# Patient Record
Sex: MALE | Race: WHITE
[De-identification: names, ages, dates, MRNs, and addresses within clinical notes are randomized per-mention and may not be internally consistent; named-entity substitution may affect disease eponyms.]

---

## 2020-10-03 NOTE — EDPHYS
Physician Documentation                                                                           

 [facility]                                                                 

Name: Naren Douglas                                                                                  

Age: 60 yrs                                                                                       

Sex: Male                                                                                         

: 1960                                                                                   

MRN: [id number]                                                                                   

Arrival Date: 10/03/2020                                                                          

Time: 09:30                                                                                       

Account#: [id number]                                                                            

Bed 8                                                                                             

Private MD:                                                                                       

ED Physician Jensen Arias                                                                       

HPI:                                                                                              

10/03                                                                                             

11:29 This 60 yrs old  Male presents to ER via Ambulatory with complaints of         snw 

      polycythemia.                                                                               

11:29 Onset: The symptoms/episode began/occurred pt has been getting therapeutic phlebotomy x snw 

      5 years, pt states he is two weeks late this time. He normally gets between 400-700mls      

      removed. Associated signs and symptoms: Pertinent positives: congestion, shortness of       

      breath, palpitations - a. fib. Modifying factors: The patient symptoms are alleviated       

      by phlebotomy. The patient has experienced similar episodes in the past. The patient        

      has not recently seen a physician.                                                          

                                                                                                  

Historical:                                                                                       

- PMHx:                                                                                           

09:58 Polycythemia;                                                                           jl7 

- PSHx:                                                                                           

09:58 stent ; Appendectomy; knee sx; Hernia repair;                                       jl7 

                                                                                                  

- Immunization history:: Adult Immunizations unknown.                                             

- Social history:: Smoking status: Patient denies any tobacco usage or history of.                

                                                                                                  

                                                                                                  

ROS:                                                                                              

11:27 Eyes: Negative for injury, pain, redness, and discharge, ENT: Negative for injury,      snw 

      pain, and discharge, Neck: Negative for injury, pain, and swelling.                         

11:27 Abdomen/GI: Negative for abdominal pain, nausea, vomiting, diarrhea, and constipation,      

      Back: Negative for injury and pain, : Negative for injury, bleeding, discharge, and       

      swelling, MS/Extremity: Negative for injury and deformity, Skin: Negative for injury,       

      rash, and discoloration, Neuro: Negative for headache, weakness, numbness, tingling,        

      and seizure, Psych: Negative for depression, anxiety, suicide ideation, homicidal           

      ideation, and hallucinations.                                                               

11:27 Constitutional: Positive for body aches, malaise, SOB.                                      

11:27 Cardiovascular: Positive for palpitations.                                                  

11:27 Respiratory: Positive for dyspnea on exertion, shortness of breath.                         

                                                                                                  

Exam:                                                                                             

09:45 Head/Face:  Normocephalic, atraumatic. Eyes:  Pupils equal round and reactive to light, snw 

      extra-ocular motions intact.  Lids and lashes normal.  Conjunctiva and sclera are           

      non-icteric and not injected.  Cornea within normal limits.  Periorbital areas with no      

      swelling, redness, or edema. ENT:  Nares patent. No nasal discharge, no septal              

      abnormalities noted.  Tympanic membranes are normal and external auditory canals are        

      clear.  Oropharynx with no redness, swelling, or masses, exudates, or evidence of           

      obstruction, uvula midline.  Mucous membranes moist. Neck:  Trachea midline, no             

      thyromegaly or masses palpated, and no cervical lymphadenopathy.  Supple, full range of     

      motion without nuchal rigidity, or vertebral point tenderness.  No Meningismus.             

      Chest/axilla:  Normal chest wall appearance and motion.  Nontender with no deformity.       

      No lesions are appreciated.                                                                 

09:45 Abdomen/GI:  Soft, non-tender, with normal bowel sounds.  No distension or tympany.  No     

      guarding or rebound.  No evidence of tenderness throughout. Back:  No spinal                

      tenderness.  No costovertebral tenderness.  Full range of motion. MS/ Extremity:            

      Pulses equal, no cyanosis.  Neurovascular intact.  Full, normal range of motion. Neuro:     

       Awake and alert, GCS 15, oriented to person, place, time, and situation.  Cranial          

      nerves II-XII grossly intact.  Motor strength 5/5 in all extremities.  Sensory grossly      

      intact.  Cerebellar exam normal.  Normal gait. Psych:  Awake, alert, with orientation       

      to person, place and time.  Behavior, mood, and affect are within normal limits.            

09:45 Constitutional: The patient appears alert, awake, obese, restless, uncomfortable.           

09:45 Cardiovascular: Rate: normal, Rhythm: irregularly irregular, Pulses: no pulse deficits      

      are appreciated, Heart sounds: normal, Edema: is not appreciated.                           

09:45 Respiratory: mild respiratory distress is noted,  Respirations: shallow respirations,       

      that is moderate, Breath sounds: decreased breath sounds, that are moderate.                

09:45 Skin: Appearance: Color: bronze with white nailbeds.                                        

                                                                                                  

Vital Signs:                                                                                      

09:31  / 113; Pulse 65; Resp 22; Temp 98.2; Pulse Ox 100% ;                             jl7 

10:18  / 114; Pulse 93; Resp 19; Pulse Ox 97% ;                                         jl7 

10:49  / 102; Pulse 86; Resp 17; Pulse Ox 99% ;                                         jl7 

11:30  / 102; Pulse 72; Resp 15; Pulse Ox 100% ;                                        jl7 

12:00  / 103; Pulse 63; Resp 15; Pulse Ox 100% ;                                        jl7 

13:00  / 103; Pulse 61; Resp 17; Pulse Ox 99% ;                                         7 

                                                                                                  

MDM:                                                                                              

09:52 Patient medically screened.                                                             snw 

13:11 Data reviewed: vital signs, nurses notes, lab test result(s), EKG, radiologic studies.  snw 

      Data interpreted: Pulse oximetry: on room air is 99 %. Interpretation: normal.              

      Counseling: I had a detailed discussion with the patient and/or guardian regarding: the     

      historical points, exam findings, and any diagnostic results supporting the                 

      discharge/admit diagnosis, lab results, radiology results, the need for outpatient          

      follow up, to return to the emergency department if symptoms worsen or persist or if        

      there are any questions or concerns that arise at home. Response to treatment: the          

      patient's symptoms have markedly improved after treatment. Special discussion: Based on     

      the patient's history, exam, and Dx evaluation, there is no indication for emergent         

      intervention or inpatient Tx. It is understood by the patient/guardian that if the Sx's     

      persist or worsen they need to return immediately for re-evaluation. Based on the           

      history and exam findings, there is no indication for further emergent testing or           

      inpatient evaluation. I discussed with the patient/guardian the need to see the             

      cardiologist for further evaluation of the symptoms. I discussed with the                   

      patient/guardian the need to see the hematologist/oncologist for further evaluation of      

      the symptoms. I discussed with the patient/guardian the need to see the primary care        

      provider for further evaluation of the symptoms.                                            

13:18 ED course: Pt states he feels much better, will f/u as soon as possible.                snw 

                                                                                                  

10/03                                                                                             

09:42 Order name: Basic Metabolic Panel                                                       snw 

10/03                                                                                             

09:42 Order name: CBC with Diff; Complete Time: 10:21                                         snw 

10/03                                                                                             

09:42 Order name: LFT's; Complete Time: 10:40                                                 snw 

10/03                                                                                             

09:42 Order name: Magnesium; Complete Time: 10:40                                             snw 

10/03                                                                                             

09:42 Order name: NT PRO-BNP; Complete Time: 10:40                                            snw 

10/03                                                                                             

09:42 Order name: PT-INR; Complete Time: 10:21                                                snw 

10/03                                                                                             

09:42 Order name: Troponin (emerg Dept Use Only); Complete Time: 10:40                        snw 

10/03                                                                                             

09:42 Order name: Urine Culture                                                               snw 

10/03                                                                                             

09:42 Order name: Urine Microscopic Only; Complete Time: 10:40                                snw 

10/03                                                                                             

09:42 Order name: Basic Metabolic Panel; Complete Time: 10:40                                 EDMS

10/03                                                                                             

09:44 Order name: Urine Dipstick--Ancillary (enter results); Complete Time: 10:40             eb  

10/03                                                                                             

09:50 Order name: Pth,Intact                                                                  snw 

10/03                                                                                             

09:50 Order name: Cortisol                                                                    snw 

10/03                                                                                             

10:58 Order name: CBC with Diff; Complete Time: 11:26                                         jl7 

10/03                                                                                             

09:42 Order name: XRAY Chest (1 view); Complete Time: 11:26                                   snw 

10/03                                                                                             

09:42 Order name: EKG; Complete Time: 09:43                                                   snw 

10/03                                                                                             

09:42 Order name: Cardiac monitoring; Complete Time: 09:59                                    snw 

10/03                                                                                             

09:42 Order name: EKG - Nurse/Tech; Complete Time: 09:44                                      snw 

10/03                                                                                             

09:42 Order name: IV Saline Lock; Complete Time: 09:59                                        snw 

10/03                                                                                             

09:42 Order name: Labs collected and sent; Complete Time: 09:59                               snw 

10/03                                                                                             

09:42 Order name: O2 Per Protocol; Complete Time: 09:59                                       snw 

10/03                                                                                             

09:42 Order name: O2 Sat Monitoring; Complete Time: 09:59                                     snw 

10/03                                                                                             

09:42 Order name: Urine Dipstick-Ancillary (obtain specimen); Complete Time: 09:59            snw 

10/03                                                                                             

09:43 Order name: Misc. Order: therapeutic phlebotomy; Complete Time: 10:49                   snw 

                                                                                                  

EC:45 Rate is 71 beats/min. Rhythm is irregularly irregular. QRS Axis is Normal. QRS interval snw 

      is normal. QT interval is normal. Q waves are Present in leads II, III, aVF. Clinical       

      impression: Atrial Fibrillation.                                                            

                                                                                                  

Administered Medications:                                                                         

10:20 Drug: Lovenox 100 mg Route: Sub-Q; Site: abdomen;                                       jl7 

13:20 Follow up: Response: No adverse reaction                                                jl7 

10:58 Drug: NS 0.9% 500 ml Route: IV; Rate: bolus; Site: left forearm;                        jl7 

11:30 Follow up: Response: No adverse reaction; IV Status: Completed infusion; IV Intake:     jl7 

      500ml                                                                                       

                                                                                                  

                                                                                                  

Disposition:                                                                                      

16:04 Co-signature as Attending Physician, Jensen Arias MD I agree with the assessment and   kdr 

      plan of care.                                                                               

                                                                                                  

Disposition:                                                                                      

10/03/20 13:13 Discharged to Home. Impression: Polycythemia vera, Atrial fibrillation and         

  flutter.                                                                                        

- Condition is Stable.                                                                            

- Discharge Instructions: Atrial Fibrillation, Polycythemia Vera, Therapeutic                     

  Phlebotomy, Care After.                                                                         

- Prescriptions for Eliquis 5 mg Oral tablet - take 1 tablet by ORAL route 2 times per            

  day; 60 tablet. flecainide 100 mg Oral tablet - take 1 tablet by ORAL route every 12            

  hours; 60 tablet.                                                                               

- Medication Reconciliation Form, Thank You Letter, Antibiotic Education, Prescription            

  Opioid Use form.                                                                                

- Follow up: Emergency Department; When: As needed; Reason: Worsening of condition.               

  Follow up: Private Physician; When: 2 - 3 days; Reason: Recheck today's complaints,             

  Continuance of care, Re-evaluation by your physician.                                           

                                                                                                  

                                                                                                  

                                                                                                  

Signatures:                                                                                       

Dispatcher [employer]                           EDMS                                                 

Jensen Arias MD MD kdr Waters, Shelly, JOAQUÍNP-C                   FNP-Bar Tran RN                        RN   jl7                                                  

                                                                                                  

Corrections: (The following items were deleted from the chart)                                    

13:34 13:13 10/03/2020 13:13 Discharged to Home. Impression: Polycythemia vera; Atrial        jl7 

      fibrillation and flutter. Condition is Stable. Forms are Medication Reconciliation          

      Form, Thank You Letter, Antibiotic Education, Prescription Opioid Use. Follow up:           

      Emergency Department; When: As needed; Reason: Worsening of condition. Follow up:           

      Private Physician; When: 2 - 3 days; Reason: Recheck today's complaints, Continuance of     

      care, Re-evaluation by your physician. snw                                                  

                                                                                                  

**************************************************************************************************

## 2020-10-03 NOTE — RAD REPORT
EXAM DESCRIPTION:  [facility] Single View10/3/2020 10:25 am

 

CLINICAL HISTORY:  Congestion

 

COMPARISON:  2015

 

FINDINGS:   The lungs appear clear of acute infiltrate. The heart is mildly to moderately enlarged

 

IMPRESSION:   No acute abnormalities displayed

## 2020-10-03 NOTE — ER
Nurse's Notes                                                                                     

 [facility]                                                                 

Name: Naren Douglas                                                                                  

Age: 60 yrs                                                                                       

Sex: Male                                                                                         

: 1960                                                                                   

MRN: [id number]                                                                                   

Arrival Date: 10/03/2020                                                                          

Time: 09:30                                                                                       

Account#: [id number]                                                                            

Bed 8                                                                                             

Private MD:                                                                                       

Diagnosis: Polycythemia vera;Atrial fibrillation and flutter                                      

                                                                                                  

Presentation:                                                                                     

10/03                                                                                             

09:31 Chief complaint: Patient states: SOB and palpitations x 3 days. Coronavirus screen:     [facility] 

      Client denies travel out of the [place] in the last 14 days. shortness of breath, Client       

      presents with at least one sign or symptom that may indicate coronavirus-19.                

      Standard/surgical mask placed on the client. Provider contacted for isolation               

      considerations. Ebola Screen: No symptoms or risks identified at this time. Initial         

      Sepsis Screen: Does the patient meet any 2 criteria? No. Patient's initial sepsis           

      screen is negative. Does the patient have a suspected source of infection? No.              

      Patient's initial sepsis screen is negative. Risk Assessment: Do you want to hurt           

      yourself or someone else? Patient reports no desire to harm self or others.                 

09:31 Method Of Arrival: Ambulatory                                                           [facility] 

09:31 Acuity: ELEANOR 3                                                                           [facility] 

09:31 Onset of symptoms was 2020. Care prior to arrival: None. Transition of care: [facility] 

      patient was not received from another setting of care.                                      

                                                                                                  

Triage Assessment:                                                                                

09:58 General: Appears in no apparent distress. uncomfortable, Behavior is calm, cooperative, jl7 

      appropriate for age. Pain: Complains of pain in right mid back. Neuro: Level of             

      Consciousness is awake, alert, obeys commands, Oriented to person, place, time,             

      situation. Cardiovascular: Patient's skin is warm and dry. Respiratory: Airway is           

      patent Respiratory effort is even, unlabored, Respiratory pattern is regular,               

      symmetrical. Derm: Skin is pink, warm \T\ dry.                                              

                                                                                                  

Historical:                                                                                       

- PMHx:                                                                                           

09:58 Polycythemia;                                                                           jl7 

- PSHx:                                                                                           

09:58 stent ; Appendectomy; knee sx; Hernia repair;                                       jl7 

                                                                                                  

- Immunization history:: Adult Immunizations unknown.                                             

- Social history:: Smoking status: Patient denies any tobacco usage or history of.                

                                                                                                  

                                                                                                  

Screening:                                                                                        

10:17 Abuse screen: Denies threats or abuse. Denies injuries from another. Nutritional        jl7 

      screening: No deficits noted. Tuberculosis screening: No symptoms or risk factors           

      identified. Fall Risk IV access (20 points). Total Wilkerson Fall Scale indicates No Risk       

      (0-24 pts).                                                                                 

                                                                                                  

Assessment:                                                                                       

10:00 General: See triage assessment.                                                         jl7 

11:00 Reassessment: Patient appears in no apparent distress at this time. No changes from     jl7 

      previously documented assessment. Patient and/or family updated on plan of care and         

      expected duration. Pain level reassessed. Patient is alert, oriented x 3, equal             

      unlabored respirations, skin warm/dry/pink.                                                 

12:00 Reassessment: Patient appears in no apparent distress at this time. Patient and/or      jl7 

      family updated on plan of care and expected duration. Pain level reassessed. Patient is     

      alert, oriented x 3, equal unlabored respirations, skin warm/dry/pink. Patient states       

      feeling better. Patient states symptoms have improved.                                      

13:00 Reassessment: Patient appears in no apparent distress at this time. No changes from     jl7 

      previously documented assessment. Patient and/or family updated on plan of care and         

      expected duration. Pain level reassessed. Patient is alert, oriented x 3, equal             

      unlabored respirations, skin warm/dry/pink.                                                 

                                                                                                  

Vital Signs:                                                                                      

09:31  / 113; Pulse 65; Resp 22; Temp 98.2; Pulse Ox 100% ;                             jl7 

10:18  / 114; Pulse 93; Resp 19; Pulse Ox 97% ;                                         jl7 

10:49  / 102; Pulse 86; Resp 17; Pulse Ox 99% ;                                         jl7 

11:30  / 102; Pulse 72; Resp 15; Pulse Ox 100% ;                                        jl7 

12:00  / 103; Pulse 63; Resp 15; Pulse Ox 100% ;                                        jl7 

13:00  / 103; Pulse 61; Resp 17; Pulse Ox 99% ;                                         jl7 

                                                                                                  

ED Course:                                                                                        

09:30 Patient arrived in ED.                                                                  as  

09:31 Bar Gonzalez, RN is Primary Nurse.                                                      jl7 

09:40 [facility] Jeff[facility]C [facility]                                                          snw 

09:40 Jensen Arias MD is Attending Physician.                                              snw 

09:44 EKG done, by ED staff, reviewed by Cathleen PAZ.                                 em1 

09:50 Initial lab(s) drawn, by me, sent to lab. Urine collected: clean catch specimen, clear. jl7 

      Inserted saline lock: 20 gauge in left forearm, using aseptic technique. Blood              

      collected.                                                                                  

09:57 Triage completed.                                                                       jl7 

09:58 Arm band placed on right wrist.                                                         jl7 

10:17 Patient has correct armband on for positive identification. Placed in gown. Bed in low  jl7 

      position. Call light in reach. Side rails up X 1. Cardiac monitor on. Pulse ox on. NIBP     

      on.                                                                                         

10:25 XRAY Chest (1 view) In Process Unspecified.                                             EDMS

10:58 Basic Metabolic Panel Sent.                                                             jl7 

10:58 Repeat lab(s) drawn. by me, sent to lab.                                                jl7 

13:34 No provider procedures requiring assistance completed. IV discontinued, intact,         jl7 

      bleeding controlled, No redness/swelling at site. Pressure dressing applied.                

                                                                                                  

Administered Medications:                                                                         

10:20 Drug: Lovenox 100 mg Route: Sub-Q; Site: abdomen;                                       jl7 

13:20 Follow up: Response: No adverse reaction                                                jl7 

10:58 Drug: NS 0.9% 500 ml Route: IV; Rate: bolus; Site: left forearm;                        jl7 

11:30 Follow up: Response: No adverse reaction; IV Status: Completed infusion; IV Intake:     jl7 

      500ml                                                                                       

                                                                                                  

                                                                                                  

Intake:                                                                                           

11:30 IV: 500ml; Total: 500ml.                                                                jl7 

                                                                                                  

Outcome:                                                                                          

13:13 Discharge ordered by MD.                                                                edson 

13:34 Discharged to home ambulatory.                                                          jl7 

13:34 Condition: stable                                                                           

13:34 Discharge instructions given to patient, Instructed on discharge instructions, follow       

      up and referral plans. medication usage, Demonstrated understanding of instructions,        

      follow-up care, medications, Prescriptions given X 2.                                       

13:34 Patient left the ED.                                                                    jl7 

                                                                                                  

Signatures:                                                                                       

Dispatcher [employer]                           EDCathleen Valdez, FNP-C                   FNP-Csnw                                                  

Ailin Ewing Eric                               em1                                                  

Bar Gonzalez, RN                        RN   jl7                                                  

                                                                                                  

**************************************************************************************************

## 2020-10-26 NOTE — RAD REPORT
EXAM DESCRIPTION:  RAD - Chest Single View - 10/26/2020 4:39 pm

 

CLINICAL HISTORY:  Cough;Dyspnea

 

COMPARISON:  AP chest October 3rd

 

TECHNIQUE:  AP portable chest image was obtained 10/26/2020 4:39 pm .

 

FINDINGS:  No peripheral mass or consolidation. Mildly prominent interstitial pattern has not changed
. Cardiac silhouette is enlarged. Vasculature within normal limits. Heart size is slightly smaller th
an comparison. No measurable pleural effusion and no pneumothorax. No acute bony abnormality seen. No
 acute aortic findings suspected.

 

IMPRESSION:  Mild cardiomegaly without significant failure or volume overload findings.

 

No focal lung parenchymal mass or infiltrate.

## 2020-10-26 NOTE — ER
Nurse's Notes                                                                                     

 [facility]                                                                 

Name: Naren Douglas                                                                                  

Age: 60 yrs                                                                                       

Sex: Male                                                                                         

: 1960                                                                                   

MRN: [id number]                                                                                   

Arrival Date: 10/26/2020                                                                          

Time: 14:46                                                                                       

Account#: [id number]                                                                            

Bed 7                                                                                             

Private MD:                                                                                       

Diagnosis: Dyspnea;Systolic (congestive) heart failure;Tobacco abuse counseling;Tobacco           

  use;Obesity, unspecified;Essential (primary) hypertension;Atrial fibrillation and               

  flutter;Unspecified kidney failure-insuffcency                                                  

                                                                                                  

Presentation:                                                                                     

10/26                                                                                             

15:03 Chief complaint: Patient states: Last night, can't sleep and SOB. Denies chest pain.    ca1 

      Today, dizziness, SOB. Reports cough today. Reports Hx of A-fib. Coronavirus screen:        

      Client denies travel out of the [place] in the last 14 days. cough unrelated to allergies,     

      shortness of breath, Client presents with at least one sign or symptom that may             

      indicate coronavirus-19. Standard/surgical mask placed on the client. Provider              

      contacted for isolation considerations. The client denies any previous COVID testing.       

      Ebola Screen: Patient negative for fever greater than or equal to 101.5 degrees             

      Fahrenheit, and additional compatible Ebola Virus Disease symptoms Patient denies           

      exposure to infectious person. Patient denies travel to an Ebola-affected area in the       

      21 days before illness onset. No symptoms or risks identified at this time. Initial         

      Sepsis Screen: Does the patient meet any 2 criteria? No. Patient's initial sepsis           

      screen is negative. Does the patient have a suspected source of infection? No.              

      Patient's initial sepsis screen is negative. Risk Assessment: Do you want to hurt           

      yourself or someone else? Patient reports no desire to harm self or others.                 

15:03 Method Of Arrival: Ambulatory                                                           ca1 

15:03 Onset of symptoms was 2020.                                                 ca1 

15:03 Acuity: ELEANOR 2                                                                           ca1 

                                                                                                  

Historical:                                                                                       

- Allergies:                                                                                      

15:09 Iodine;                                                                                 ca1 

15:09 Iodinated Contrast Media - IV Dye;                                                      ca1 

- Home Meds:                                                                                      

15:09 Metoprolol Tartrate Oral [Active]; flecainide oral oral [Active]; Eliquis oral oral     ca1 

      [Active];                                                                                   

15:09 Furosemide Oral [Active];                                                               ca1 

- PMHx:                                                                                           

15:09 Polycythemia; Hypertension; Atrial Fib;                                                 ca1 

- PSHx:                                                                                           

15:09 stent ; Appendectomy; knee sx; Hernia repair;                                       ca1 

                                                                                                  

- Immunization history:: Adult Immunizations up to date, Flu vaccine is not up to date.           

- Social history:: Smoking status: Patient reports the use of cigarette tobacco                   

  products, smokes one-half pack cigarettes per day.                                              

                                                                                                  

                                                                                                  

Screenin:28 Abuse screen: Denies threats or abuse. Nutritional screening: No deficits noted.        tw2 

      Tuberculosis screening: No symptoms or risk factors identified. Fall Risk None              

      identified.                                                                                 

                                                                                                  

Assessment:                                                                                       

15:45 General: Appears in no apparent distress. obese, well groomed, Behavior is calm,        tw2 

      cooperative, appropriate for age. Pain: Denies pain. Neuro: Level of Consciousness is       

      awake, alert, obeys commands, Oriented to person, place, time, situation.                   

      Cardiovascular: Heart tones S1 S2 Patient's skin is warm and dry. Rhythm is atrial          

      fibrillation. Respiratory: Reports shortness of breath at rest on exertion cough that       

      is since today Airway is patent Respiratory effort is even, unlabored, Respiratory          

      pattern is regular, symmetrical, Breath sounds are clear bilaterally. GI: No signs          

      and/or symptoms were reported involving the gastrointestinal system. Abdomen is round       

      non-distended, obese, Bowel sounds present X 4 quads. : No signs and/or symptoms were     

      reported regarding the genitourinary system. EENT: No signs and/or symptoms were            

      reported regarding the EENT system. Derm: No signs and/or symptoms reported regarding       

      the dermatologic system. Musculoskeletal: Range of motion: intact in all extremities.       

16:37 Reassessment: Patient appears in no apparent distress at this time. No changes from     tw2 

      previously documented assessment. Patient and/or family updated on plan of care and         

      expected duration. Pain level reassessed. Patient is alert, oriented x 3, equal             

      unlabored respirations, skin warm/dry/pink. provider at bedside at this time.               

17:30 Reassessment: Patient appears in no apparent distress at this time. No changes from     tw2 

      previously documented assessment. Patient and/or family updated on plan of care and         

      expected duration. Pain level reassessed. Patient is alert, oriented x 3, equal             

      unlabored respirations, skin warm/dry/pink.                                                 

19:36 Reassessment: Patient and/or family updated on plan of care and expected duration. Pain ea  

      level reassessed. Patient is alert, oriented x 3, equal unlabored respirations, skin        

      warm/dry/pink. Report given to receiving nurse on second floor.                             

19:54 Reassessment: Patient and/or family updated on plan of care and expected duration. Pain ea  

      level reassessed. Patient is alert, oriented x 3, equal unlabored respirations, skin        

      warm/dry/pink. Pt admitted to second floor, pt left ED via wheelchair per tech.             

      Tolerating well.                                                                            

                                                                                                  

Vital Signs:                                                                                      

15:03  / 105; Pulse 71; Resp 22 S; Temp 97.2(TE); Pulse Ox 97% on R/A; Weight 122.47 kg ca1 

      (R); Height 6 ft. 2 in. (187.96 cm) (R); Pain 0/10;                                         

16:26  / 107; Pulse 77; Resp 20; Pulse Ox 100% on R/A;                                  tw2 

17:29  / 105; Pulse 79; Resp 18; Pulse Ox 97% on R/A;                                   tw2 

18:15  / 82; Pulse 69; Resp 15; Pulse Ox 100% ;                                         jl7 

18:27 Temp 97.6(O);                                                                           mh5 

19:21  / 83; Pulse 66; Resp 19; Pulse Ox 99% ;                                          rr5 

15:03 Body Mass Index 34.67 (122.47 kg, 187.96 cm)                                            ca1 

                                                                                                  

ED Course:                                                                                        

14:46 Patient arrived in ED.                                                                  ag5 

15:06 Triage completed.                                                                       ca1 

15:09 Arm band placed on right wrist.                                                         ca1 

15:18 EKG completed in triage. Results shown to MD.                                           ca1 

15:44 Geo Johnson MD is Attending Physician.                                             pam 

15:55 Initial lab(s) drawn, by me, sent to lab. Inserted saline lock: 18 gauge in right       aa5 

      antecubital area, using aseptic technique. Blood collected.                                 

16:00 Patient has correct armband on for positive identification. Placed in gown. Bed in low  mh5 

      position. Call light in reach. Side rails up X 1. Warm blanket given. Cardiac monitor       

      on. Pulse ox on. NIBP on.                                                                   

16:00 First set of blood cultures drawn by me.                                                aa5 

16:01 Basic Metabolic Panel Sent.                                                             5 

16:01 CBC with Diff Sent.                                                                     5 

16:02 LFT's Sent.                                                                             5 

16:02 Magnesium Sent.                                                                         5 

16:02 NT PRO-BNP Sent.                                                                        5 

16:02 PT-INR Sent.                                                                            [facility] 

16:02 Troponin (emerg Dept Use Only) Sent.                                                    5 

16:03 Procalcitonin Sent.                                                                     5 

16:03 Lactate Sent.                                                                           5 

16:03 Flu Sent.                                                                               5 

16:03 COVID-19 Sent.                                                                          5 

16:14 Qian Hernandez RN is Primary Nurse.                                                        tw2 

16:39 XRAY Chest (1 view) In Process Unspecified.                                             EDMS

16:50 Second set of blood cultures drawn by me.                                               em1 

16:56 Antonio Regalado DO is Hospitalizing Provider.                                           [facility] 

19:00 Report given to SANJU Vargas and SANJU Balderrama.                                               tw2 

19:30 Primary Nurse role handed off by Qian Hernandez RN                                         mw2 

19:35 No provider procedures requiring assistance completed. Patient admitted, IV remains in  ea  

      place.                                                                                      

19:36 Candace Zayas RN is Primary Nurse.                                                    ea  

                                                                                                  

Administered Medications:                                                                         

      Discontinued: NS 0.9% 1000 ml IV at 125 ml/hr continuous                                    

16:26 Drug: NS 0.9% 1000 ml Route: IV; Rate: 125 ml/hr; Site: right antecubital;              tw2 

17:16 Follow up: Response: No adverse reaction; IV Status: Completed infusion                 jl7 

17:16 Drug: Lasix 40 mg Route: IVP; Site: right antecubital;                                  jl7 

18:49 Follow up: Response: No adverse reaction                                                tw2 

19:03 Drug: Potassium Effervescent Tablet 25 mEq Route: PO;                                   tw2 

19:04 Follow up: Response: No adverse reaction                                                tw2 

19:28 Drug: Zofran (Ondansetron) 4 mg Route: IVP; Site: right antecubital;                    rr5 

19:55 Follow up: Response: No adverse reaction                                                ea  

19:30 Drug: morphine 2 mg {Note: rass 0.} Route: IVP; Site: right antecubital;                rr5 

19:55 Follow up: Response: No adverse reaction                                                ea  

                                                                                                  

                                                                                                  

Outcome:                                                                                          

16:58 Decision to Hospitalize by Provider.                                                    pam 

19:37 Condition: stable                                                                       ea  

19:37 Instructed on the need for admit, Demonstrated understanding of instructions.               

19:54 Patient left the ED.                                                                    ea  

19:55 Admitted to Med/surg accompanied by tech, via wheelchair, room 223, with chart, Report  ea  

      called to  Receiving nurse on second floor                                                  

                                                                                                  

Signatures:                                                                                       

Dispatcher [employer]                           EDGeo Villarreal MD MD cha Martinez, Eric                               em1                                                  

Edita Durham, RN                     RN   aa5                                                  

Qian Hernandez RN                          RN   tw2                                                  

Nettie Ewing                              5                                                  

Bar Gonzalez, RN                        RN   jl7                                                  

Candace Zayas RN                      RN   ea                                                   

Galen Hickman                            2                                                  

Tacos Tucker, RN                      RN   rr5                                                  

Isatu Crane RN                        RN   ca1                                                  

Alice, Kasandra                                ag5                                                  

                                                                                                  

Corrections: (The following items were deleted from the chart)                                    

15: 15:03 Acuity: ELEANOR 3 ca1                                                                 ca1 

15: 15:03 Pulse 71bpm; Resp 22bpm; Spontaneous; Pulse Ox 97% RA; Temp 97.2F Temporal;       ca1 

      122.47 kg Reported; Height 6 ft. 2 in. Reported; BMI: 34.6; Pain 0/10; ca1                  

                                                                                                  

**************************************************************************************************

## 2020-10-26 NOTE — P.HP
Certification for Inpatient


Patient admitted to: Observation


With expected LOS: <2 Midnights


Patient will require the following post-hospital care: None


Practitioner: I am a practitioner with admitting privileges, knowledge of 

patient current condition, hospital course, and medical plan of care.


Services: Services provided to patient in accordance with Admission requirements

found in Title 42 Section 412.3 of the Code of Federal Regulations





Patient History


Date of Service: 10/26/20


Primary Care Provider: none


Reason for admission: Dyspnea


History of Present Illness: 








60-year-old  male with history of atrial fibrillation on chronic anti 

coagulation therapy, hypertension, obstructive sleep apnea, CAD with prior st

ent, polycythemia vera and obesity.





Patient presented with increasing shortness of breath.  He reports shortness of 

breath started last night.  It was difficult for him to go to sleep last night. 

He also reported some increasing edema to the lower extremities.  Patient is not

on a fluid restriction.  Patient takes medications for AFib, hypertension.  

Patient denies any cough, fever, chills.  Patient also denies any significant 

chest pain. His been over 4 year since he last saw all cardiology.  He recently 

moved to the area and plans to continued living here.  He was getting his care 

and [facility].  In to the ER for further evaluation.





In the ER patient was evaluated.  Initial cardiac enzymes unremarkable.  

Troponin 0.03.  BNP elevated at over 3000.  Chest x-ray showed some mild p

ulmonary edema. White count 7.7, hemoglobin 14. Platelet count 140. Sodium 142, 

potassium 4.7.  BUN of 18, creatinine 1.38 with a GFR 52. Glucose 96. Vital 

signs showed slightly elevated blood pressure.  Patient in atrial fibrillation 

but rate controlled.  Patient was given IV Lasix in the emergency room.  Patient

admitted for possible underlying acute on chronic systolic CHF.





When I saw the patient ER, patient appeared stable.


Allergies





No Known Allergies Allergy (Verified 13 07:09)


   





Home medications list reviewed: Yes


Home Medications: 








Furosemide [Lasix*] 40 mg PO DAILY 13 


Metformin HCl 500 mg PO DAILY 13 


Metoprolol Tartrate [Lopressor*] 100 mg PO BID 12/15/13 


Nitroglycerin [Nitrostat*] 0.4 mg SL UD PRN #0 btl 13 


Rivaroxaban [Xarelto*] 20 mg PO DAILY AT SUPPER #0 tablet 13 


Dronedarone [Multaq*] 400 mg PO DAILY 09/16/15 


Hydrocodone Bit/Acetaminophen [Hydrocodon-Acetaminophen 5-325] 1 tab PO Q4HP PRN

09/16/15 


Losartan Potassium 100 mg PO DAILY 09/16/15 








- Past Medical/Surgical History


Diabetic: Yes


-: HTN


-: Hyperlipidemia


-: Atrial fibrillation, on chronic anti coagulation therapy


-: CAD, Previous stent


-: Obesity


-: Obstructive sleep apnea


-: Tobacco abuse


-: Cardiac stent


-: Bilateral knee arthroscopic surgery


-: Appendectomy


-: Hernia repair


Psychosocial/ Personal History: patient is 





- Family History


  ** Father


-: Cancer


Notes: , colon rectal ca





  ** Mother


-: Heart disease, Hypertension


Notes: 





  ** Brother


-: Diabetes





- Social History


Smoking Status: Light Tobacco smoker (1-9 cigarettes/day)


Counseled patient to stop smoking for: less than 10 minutes


Smoking therapy provided: Yes


Patient receptive to therapy: Yes


Alcohol use: Yes


CD- Drugs: No


Caffeine use: Yes


Place of Residence: Home





Review of Systems


General: As per HPI


Eyes: Unremarkable


ENT: Unremarkable


Respiratory: Shortness of Breath, SOB with Excertion, As per HPI


Cardiovascular: Edema, As per HPI


Gastrointestinal: Unremarkable


Genitourinary: Unremarkable


Musculoskeletal: Pedal edema, As per HPI


Integumentary: Unremarkable


Neurological: Unremarkable


Lymphatics: Unremarkable





Physical Examination





- Physical Exam


General: Alert, In no apparent distress, Oriented x3, Cooperative


HEENT: Atraumatic, Normocephalic, Mucous membr. moist/pink


Neck: Supple


Respiratory: Diminished (Diminished to the bases bilateral)


Cardiovascular: Abnormal pulses (Atrial fibrillation rate controlled)


Gastrointestinal: Normal bowel sounds, Soft and benign, Non-distended, No 

tenderness, No masses, No rebound, No guarding


Musculoskeletal: No erythema, No tenderness, No warmth


Integumentary: Tenderness/swelling (1 to 2+ pitting edema to the lower 

extremities bilateral)


Neurological: Normal speech, Normal strength at 5/5 x4 extr, Normal tone, Normal

affect





- Studies


Laboratory Data (last 24 hrs)





10/26/20 15:55: PT 19.8 H, INR 1.70


10/26/20 15:55: WBC 7.7, Hgb 16.0, Hct 48.7, Plt Count 170


10/26/20 15:55: Sodium 142, Potassium 4.7, BUN 18, Creatinine 1.38 H, Glucose 

96, Magnesium 2.4, Total Bilirubin 1.0, AST 38 H, ALT 69, Alkaline Phosphatase 

73








Assessment and Plan





- Plan





Impression:


Dyspnea with edema to the lower extremities secondary to acute on chronic 

systolic CHF


Atrial fibrillation on chronic anti coagulation therapy


Hypertension


Obstructive sleep apnea


Polycythemia vera


Tobacco abuse


Obesity





Plan:


Dyspnea with edema to the lower extremities secondary to acute on chronic 

systolic CHF:  Patient will be admitted for further evaluation and treatment.  

Will start IV Lasix 40 mg twice daily.  Will teach on 1500 cc per day fluid 

restriction and low-salt diet.  Will need to obtain and restart home medication 

including Eliquis, flecainide, metoprolol.  Monitor electrolytes closely.  

Replace in protocol in place.  Will obtain echocardiogram to further evaluate 

his condition.  Will continue monitor cardiac enzymes and telemetry.  Will 

consult cardiology to further evaluate.  Await recommendation.  Anticipate 

improvement over the next 24 hr.  Likely discharge tomorrow if significantly 

improved.


Atrial fibrillation on chronic anti coagulation therapy:  Restart Eliquis.  

Continue with his home medication of flecainide and metoprolol.  Will need to 

verify home medication.


Hypertension:  Obtain and restart metoprolol.  Will need to verify home 

medication


Obstructive sleep apnea:  Patient uses CPAP at night.  Will have respiratory 

provide CPAP tonight.


Polycythemia vera:  CBC stable.  Patient had blood removed recently.


Tobacco abuse:  Tobacco cessation addressed in detail.  Patient plans to quit.  

Will provide nicotine patch.


Obesity:  Will obtain BMI.  Will address lifestyle modification education.


Discharge Plan: Home


Plan to discharge in: 24 Hours





- Advance Directives


Does patient have a Living Will: No


Does patient have a Durable POA for Healthcare: No





- Code Status/Comfort Care


Code Status Assessed: Yes (Patient is full code)


Time Spent Managing Pts Care (In Minutes): 55

## 2020-10-26 NOTE — EDPHYS
Physician Documentation                                                                           

 [facility]                                                                 

Name: Naren Douglas                                                                                  

Age: 60 yrs                                                                                       

Sex: Male                                                                                         

: 1960                                                                                   

MRN: [id number]                                                                                   

Arrival Date: 10/26/2020                                                                          

Time: 14:46                                                                                       

Account#: [id number]                                                                            

Bed 7                                                                                             

Private MD:                                                                                       

JOSE Physician Geo Johnson                                                                      

HPI:                                                                                              

10/26                                                                                             

16:45 This 60 yrs old  Male presents to ER via Ambulatory with complaints of         pam 

      Dizziness, Breathing Difficulty.                                                            

16:45 The patient presents with dizziness, generalized weakness.                              pam 

                                                                                                  

Historical:                                                                                       

- Allergies:                                                                                      

15:09 Iodine;                                                                                 ca1 

15:09 Iodinated Contrast Media - IV Dye;                                                      ca1 

- Home Meds:                                                                                      

15:09 Metoprolol Tartrate Oral [Active]; flecainide oral oral [Active]; Eliquis oral oral     ca1 

      [Active];                                                                                   

15:09 Furosemide Oral [Active];                                                               ca1 

- PMHx:                                                                                           

15:09 Polycythemia; Hypertension; Atrial Fib;                                                 ca1 

- PSHx:                                                                                           

15:09 stent ; Appendectomy; knee sx; Hernia repair;                                       ca1 

                                                                                                  

- Immunization history:: Adult Immunizations up to date, Flu vaccine is not up to date.           

- Social history:: Smoking status: Patient reports the use of cigarette tobacco                   

  products, smokes one-half pack cigarettes per day.                                              

                                                                                                  

                                                                                                  

ROS:                                                                                              

16:45 Constitutional: Negative for fever, chills, and weight loss, Eyes: Negative for injury, pam 

      pain, redness, and discharge, ENT: Negative for injury, pain, and discharge, Neck:          

      Negative for injury, pain, and swelling, Cardiovascular: Negative for chest pain,           

      palpitations, and edema, Abdomen/GI: Negative for abdominal pain, nausea, vomiting,         

      diarrhea, and constipation, Back: Negative for injury and pain, : Negative for            

      injury, bleeding, discharge, and swelling, Skin: Negative for injury, rash, and             

      discoloration, Neuro: Negative for headache, weakness, numbness, tingling, and seizure,     

      Psych: Negative for depression, anxiety, suicide ideation, homicidal ideation, and          

      hallucinations, Allergy/Immunology: Negative for hives, rash, and allergies, Endocrine:     

      Negative for neck swelling, polydipsia, polyuria, polyphagia, and marked weight             

      changes, Hematologic/Lymphatic: Negative for swollen nodes, abnormal bleeding, and          

      unusual bruising.                                                                           

16:45 Respiratory: Positive for cough, dyspnea on exertion, shortness of breath, at rest.         

                                                                                                  

Exam:                                                                                             

16:45 Constitutional:  This is a well developed, well nourished patient who is awake, alert,  pam 

      and in no acute distress. Head/Face:  Normocephalic, atraumatic. Eyes:  Pupils equal        

      round and reactive to light, extra-ocular motions intact.  Lids and lashes normal.          

      Conjunctiva and sclera are non-icteric and not injected.  Cornea within normal limits.      

      Periorbital areas with no swelling, redness, or edema. ENT:  Nares patent. No nasal         

      discharge, no septal abnormalities noted.  Tympanic membranes are normal and external       

      auditory canals are clear.  Oropharynx with no redness, swelling, or masses, exudates,      

      or evidence of obstruction, uvula midline.  Mucous membranes moist. Chest/axilla:           

      Normal chest wall appearance and motion.  Nontender with no deformity.  No lesions are      

      appreciated. Cardiovascular:  Regular rate and rhythm with a normal S1 and S2.  No          

      gallops, murmurs, or rubs.  Normal PMI, no JVD.  No pulse deficits. Abdomen/GI:  Soft,      

      non-tender, with normal bowel sounds.  No distension or tympany.  No guarding or            

      rebound.  No evidence of tenderness throughout. Back:  No spinal tenderness.  No            

      costovertebral tenderness.  Full range of motion. Male :  Normal genitalia with no        

      discharge or lesions. Skin:  Warm, dry with normal turgor.  Normal color with no            

      rashes, no lesions, and no evidence of cellulitis.                                          

16:59 ECG was reviewed by the Attending Physician.                                            [facility] 

18:52 ECG was reviewed by the Attending Physician.                                            [facility] 

                                                                                                  

Vital Signs:                                                                                      

15:03  / 105; Pulse 71; Resp 22 S; Temp 97.2(TE); Pulse Ox 97% on R/A; Weight 122.47 kg ca1 

      (R); Height 6 ft. 2 in. (187.96 cm) (R); Pain 0/10;                                         

16:26  / 107; Pulse 77; Resp 20; Pulse Ox 100% on R/A;                                  tw2 

17:29  / 105; Pulse 79; Resp 18; Pulse Ox 97% on R/A;                                   tw2 

18:15  / 82; Pulse 69; Resp 15; Pulse Ox 100% ;                                         jl7 

18:27 Temp 97.6(O);                                                                           mh5 

19:21  / 83; Pulse 66; Resp 19; Pulse Ox 99% ;                                          rr5 

15:03 Body Mass Index 34.67 (122.47 kg, 187.96 cm)                                            ca1 

                                                                                                  

MDM:                                                                                              

15:44 Patient medically screened.                                                             pam 

16:49 Differential diagnosis: Anemia Bronchitis CHF exacerbation, pulmonary edema, reactive   pam 

      airway disease, Sepsis. Antibiotic administration: Not indicated. Differential              

      Diagnosis sepsis, flu. Differential diagnosis: cardiac arrhythmia, generalized              

      weakness, idiopathic dizziness, near-syncope, sepsis. The patient's Wells Deep Vein         

      Thrombosis Score was calculated as follows: Total Score: 0-2 Pts- Low Risk. The             

      patient's pulmonary embolism risk score was calculated as follows: Total Score: 0-2         

      points. This patient was found to be at low risk for a pulmonary embolism by using the      

      Well's assessment criteria. Immunization status: Influenza vaccine: Not up to date.         

      Data reviewed: vital signs, nurses notes, lab test result(s), EKG, radiologic studies,      

      plain films. Data interpreted: Cardiac monitor: rate is 64 beats/min, Pulse oximetry:       

      on room air is 100 %. Test interpretation: by ED physician or midlevel provider: ECG,       

      plain radiologic studies.                                                                   

                                                                                                  

10/26                                                                                             

15:47 Order name: Basic Metabolic Panel; Complete Time: 16:44                                 [facility] 

10/26                                                                                             

15:47 Order name: CBC with Diff; Complete Time: 16:44                                         [facility] 

10/26                                                                                             

15:47 Order name: LFT's; Complete Time: 16:44                                                 [facility] 

10/26                                                                                             

15:47 Order name: Magnesium; Complete Time: 16:44                                             [facility] 

10/26                                                                                             

15:47 Order name: NT PRO-BNP; Complete Time: 16:44                                            [facility] 

10/26                                                                                             

15:47 Order name: PT-INR; Complete Time: 16:44                                                [facility] 

10/26                                                                                             

15:47 Order name: Troponin (emerg Dept Use Only); Complete Time: 16:44                        [facility] 

10/26                                                                                             

15:47 Order name: XRAY Chest (1 view); Complete Time: 18:08                                   [facility] 

10/26                                                                                             

15:47 Order name: Blood Culture Adult (2)                                                     [facility] 

10/26                                                                                             

15:47 Order name: Lactate; Complete Time: 16:44                                               [facility] 

10/26                                                                                             

15:47 Order name: Procalcitonin; Complete Time: 16:58                                         [facility] 

10/26                                                                                             

15:47 Order name: Flu; Complete Time: 18:08                                                   [facility] 

10/26                                                                                             

15:47 Order name: COVID-19                                                                    [facility] 

10/26                                                                                             

18:47 Order name: SARS-COV-2 RT PCR; Complete Time: 19:06                                     [facility]

10/26                                                                                             

15:15 Order name: EKG; Complete Time: 15:15                                                   ca1 

10/26                                                                                             

15:15 Order name: EKG - Nurse/Tech; Complete Time: 15:15                                      ca1 

10/26                                                                                             

15:47 Order name: Cardiac monitoring; Complete Time: 16:01                                    [facility] 

10/26                                                                                             

15:47 Order name: IV Saline Lock; Complete Time: 16:01                                        [facility] 

10/26                                                                                             

15:47 Order name: Labs collected and sent; Complete Time: 16:01                               [facility] 

10/26                                                                                             

15:47 Order name: O2 Per Protocol; Complete Time: 16:03                                       [facility] 

10/26                                                                                             

15:47 Order name: O2 Sat Monitoring; Complete Time: 16:03                                     [facility] 

10/26                                                                                             

19:07 Order name: Misc. Order: please continus all usual meds; Complete Time: 19:37           pam 

                                                                                                  

EC:59 Rate is 64 beats/min. Rhythm is irregularly irregular. QRS Axis is Normal. MD interval  pam 

      is normal. QRS interval is prolonged at 122 msec. QT interval is normal. No Q waves. T      

      waves are Normal. No ST changes noted. Clinical impression: Atrial Fibrillation.            

      Interpreted by me. Reviewed by me.                                                          

18:52 Rate is 66 beats/min. Rhythm is irregularly irregular. QRS Axis is Normal. MD interval  pam 

      is normal. QRS interval is prolonged at 126 msec. QT interval is normal. No Q waves. T      

      waves are Normal. No ST changes noted. Clinical impression: Atrial Fibrillation.            

      Interpreted by me. Reviewed by me.                                                          

                                                                                                  

Administered Medications:                                                                         

      Discontinued: NS 0.9% 1000 ml IV at 125 ml/hr continuous                                    

16:26 Drug: NS 0.9% 1000 ml Route: IV; Rate: 125 ml/hr; Site: right antecubital;              tw2 

17:16 Follow up: Response: No adverse reaction; IV Status: Completed infusion                 jl7 

17:16 Drug: Lasix 40 mg Route: IVP; Site: right antecubital;                                  jl7 

18:49 Follow up: Response: No adverse reaction                                                tw2 

19:03 Drug: Potassium Effervescent Tablet 25 mEq Route: PO;                                   tw2 

19:04 Follow up: Response: No adverse reaction                                                tw2 

19:28 Drug: Zofran (Ondansetron) 4 mg Route: IVP; Site: right antecubital;                    rr5 

19:55 Follow up: Response: No adverse reaction                                                ea  

19:30 Drug: morphine 2 mg {Note: rass 0.} Route: IVP; Site: right antecubital;                rr5 

19:55 Follow up: Response: No adverse reaction                                                ea  

                                                                                                  

                                                                                                  

Disposition:                                                                                      

10/26/20 16:58 Hospitalization ordered by Antonio Regalado for Inpatient Admission. Preliminary     

  diagnosis are Dyspnea, Systolic (congestive) heart failure, Tobacco abuse                       

  counseling, Tobacco use, Obesity, unspecified, Essential (primary)                              

  hypertension, Atrial fibrillation and flutter, Unspecified kidney failure -                     

  insuffcency.                                                                                    

- Bed requested for Telemetry/MedSurg (Inpatient).                                                

- Status is Inpatient Admission.                                                              ea  

- Condition is Fair.                                                                              

- Problem is new.                                                                                 

- Symptoms have improved.                                                                         

                                                                                                  

                                                                                                  

                                                                                                  

Signatures:                                                                                       

Dispatcher [employer]                           EDMS                                                 

Verito White Corey, MD MD cha Wise, Tara, RN                          RN   tw2                                                  

Bar Gonzalez RN                        RN   jl7                                                  

Candace Zayas RN                      Tacos Castillo ea RN                      RN   rr5                                                  

Isatu Crane RN                        RN   ca1                                                  

                                                                                                  

Corrections: (The following items were deleted from the chart)                                    

16:59 16:58 Hospitalization Ordered by Antonio Regalado DO for Inpatient Admission. Preliminary  pam 

      diagnosis is Dyspnea; Systolic (congestive) heart failure; Tobacco abuse counseling;        

      Tobacco use; Obesity, unspecified; Essential (primary) hypertension; Atrial                 

      fibrillation and flutter. Bed requested for Telemetry/MedSurg (Inpatient). Status is        

      Inpatient Admission. Condition is Fair. Problem is new. Symptoms have improved. pam         

18:36 16:59 10/26/2020 16:58 Hospitalization Ordered by Antonio Regalado DO for Inpatient        bd  

      Admission. Preliminary diagnosis is Dyspnea; Systolic (congestive) heart failure;           

      Tobacco abuse counseling; Tobacco use; Obesity, unspecified; Essential (primary)            

      hypertension; Atrial fibrillation and flutter; Unspecified kidney failure -                 

      insuffcency. Bed requested for Telemetry/MedSurg (Inpatient). Status is Inpatient           

      Admission. Condition is Fair. Problem is new. Symptoms have improved. pam                   

19:54 18:36 10/26/2020 16:58 Hospitalization Ordered by Antonio Regalado DO for Inpatient        ea  

      Admission. Preliminary diagnosis is Dyspnea; Systolic (congestive) heart failure;           

      Tobacco abuse counseling; Tobacco use; Obesity, unspecified; Essential (primary)            

      hypertension; Atrial fibrillation and flutter; Unspecified kidney failure -                 

      insuffcency. Bed requested for Telemetry/MedSurg (Inpatient). Status is Inpatient           

      Admission. Condition is Fair. Problem is new. Symptoms have improved. bd                    

                                                                                                  

**************************************************************************************************

## 2020-10-27 NOTE — EKG
Test Date:    2020-10-26               Test Time:    15:22:05

Technician:   CA                                     

                                                     

MEASUREMENT RESULTS:                                       

Intervals:                                           

Rate:         64                                     

AL:                                                  

QRSD:         122                                    

QT:           488                                    

QTc:          503                                    

Axis:                                                

P:                                                   

AL:                                                  

QRS:          40                                     

T:            77                                     

                                                     

INTERPRETIVE STATEMENTS:                                       

                                                     

Undetermined rhythm

Inferior infarct, age undetermined

Anterolateral infarct, age undetermined

Abnormal ECG

Compared to ECG 10/03/2020 12:03:01

Atrial fibrillation no longer present

Ventricular premature complex(es) no longer present

Myocardial infarct finding still present



Electronically Signed On 10-27-20 10:06:48 CDT by Kamlesh Driscoll

## 2020-10-27 NOTE — EKG
Test Date:    2020-10-26               Test Time:    17:49:43

Technician:   ERUM                                    

                                                     

MEASUREMENT RESULTS:                                       

Intervals:                                           

Rate:         66                                     

FL:                                                  

QRSD:         126                                    

QT:           496                                    

QTc:          519                                    

Axis:                                                

P:                                                   

FL:                                                  

QRS:          20                                     

T:            86                                     

                                                     

INTERPRETIVE STATEMENTS:                                       

                                                     

Undetermined rhythm

Nonspecific intraventricular block

Inferior infarct, age undetermined

Anterolateral infarct, age undetermined

Abnormal ECG

Compared to ECG 10/26/2020 15:22:05

No significant changes



Electronically Signed On 10-27-20 10:06:40 CDT by Kamlesh Driscoll

## 2020-10-27 NOTE — RAD REPORT
EXAM DESCRIPTION:  RAD - Chest Pa And Lat (2 Views) - 10/27/2020 6:06 am

 

CLINICAL HISTORY:  follow up CHF

 

COMPARISON:  October 26

 

TECHNIQUE:  Frontal and lateral views of the chest were obtained.

 

FINDINGS:  The lungs are clear of a peripheral mass or consolidation. Central vasculature and interst
itial markings remain prominent.   Cardiac silhouette remains enlarged. No pleural effusion or pneumo
thorax seen.  No acute bony finding noted.  No aortic abnormality.

 

IMPRESSION:  Heart, vasculature and lung markings all remain prominent.

 

Mild failure/ volume overload remains the primary consideration.

## 2020-10-27 NOTE — P.DS
Admission Date: 10/26/20


Discharge Date: 10/27/20


Primary Care Provider: none


Disposition: ROUTINE DISCHARGE


Discharge Condition: GOOD


Reason for Admission: Dyspnea


Consultations: 





Cardiology-Dr. Driscoll





Procedures: 





Follow up CXR: 


COMPARISON:  October 26 


TECHNIQUE:  Frontal and lateral views of the chest were obtained. 


FINDINGS:  The lungs are clear of a peripheral mass or consolidation. Central 

vasculature and interstitial markings remain prominent.   Cardiac silhouette 

remains enlarged. No pleural effusion or pneumothorax seen.  No acute bony 

finding noted.  No aortic abnormality. 


IMPRESSION:  Heart, vasculature and lung markings all remain prominent. 


Mild failure/ volume overload remains the primary consideration.





ECHO: 


51% EF


LEFT VENTRICULAR WALL MOTION:     NORMAL EJECTION FRACTION.


DOPPLER/COLOR FLOW:     MILD TRICUSPID REGURGITATION.


COMMENTS:      NORMAL EJECTION FRACTION. LEFT VENTRICULAR HYPERTROPHY. DECREASED

LEFT VENTRICULAR COMPLIANCE. AORTIC SCLEROSIS WITH NO STENOSIS. LEFT ATRIAL 

ENLARGEMENT





Medical Problem List:


Dyspnea with edema to the lower extremities secondary to acute on chronic 

diastolic CHF


Atrial fibrillation on chronic anti coagulation therapy


Chronic renal disease stage III


Hypertension


Obstructive sleep apnea


Polycythemia vera


Subclinical hypothyroidism


Tobacco abuse


Obesity, BMI 39





Brief History of Present Illness: 








60-year-old  male with history of atrial fibrillation on chronic anti 

coagulation therapy, hypertension, obstructive sleep apnea, CAD with prior 

stent, polycythemia vera and obesity.





Patient presented with increasing shortness of breath.  He reports shortness of 

breath started last night.  It was difficult for him to go to sleep last night. 

He also reported some increasing edema to the lower extremities.  Patient is not

on a fluid restriction.  Patient takes medications for AFib, hypertension.  

Patient denies any cough, fever, chills.  Patient also denies any significant 

chest pain. His been over 4 year since he last saw all cardiology.  He recently 

moved to the area and plans to continued living here.  He was getting his care 

and [facility] [place].  In to the ER for further evaluation.





In the ER patient was evaluated.  Initial cardiac enzymes unremarkable.  

Troponin 0.03.  BNP elevated at over 3000.  Chest x-ray showed some mild 

pulmonary edema. White count 7.7, hemoglobin 14. Platelet count 140. Sodium 142,

potassium 4.7.  BUN of 18, creatinine 1.38 with a GFR 52. Glucose 96. Vital 

signs showed slightly elevated blood pressure.  Patient in atrial fibrillation 

but rate controlled.  Patient was given IV Lasix in the emergency room.  Patient

admitted for possible underlying acute on chronic systolic CHF.





When I saw the patient ER, patient appeared stable.


Hospital Course: 





Patient presented with dyspnea and edema to the lower extremities secondary to 

acute on chronic diastolic CHF.  X-ray of revealed volume overload.  Patient was

admitted for further evaluation and treatment.  Patient received IV diuretic 

therapy.  CHF education provided.  Fluid restriction and salt restriction 

address in detail.  Echocardiogram performed.  Ejection fraction within normal 

range.  Repeat chest x-ray showed improvement.  Cardiac enzymes unremarkable.  

Cardiology was consulted for further recommendation.  At discharge patient 

without significant shortness of breath.  Edema has significantly resolved.  At 

discharge patient will continue with a 1500 cc per day fluid restriction and 

low-salt diet.  Patient should monitor his weight daily.  If his weight 

increases by more than 5 lb he is to contact cardiology or PCP to further mo

nitor and adjust his medication.  Adjustment in Lasix will be provided.  At 

discharge patient will continue with Lasix 40 mg 1 pill twice daily along with 

potassium supplementation twice daily.  Further adjustment in medication may be 

required.  This can be done with the help of his PCP or cardiology.  Education 

on CHF provided.  Recommend follow up with cardiology in 1-2 weeks to follow up 

this hospitalization.





Patient with history of atrial fibrillation on chronic anti coagulation therapy 

and hypertension.  This has remained stable.  At discharge patient will continue

with his current medications including Eliquis 5 mg twice daily, flecainide 100 

mg twice daily and metoprolol 100 mg twice daily.  Recommend follow up with 

cardiology to further monitor and adjust medication.





Patient with history of obstructive sleep apnea.  Patient uses CPAP at night.  

Recommend to continue with CPAP.  Recommend follow up with pulmonology as an 

outpatient to further monitor and adjust his treatment.





Patient with chronic renal disease stage III.  This has remained stable.  

Patient on diuretic therapy.  Recommend to recheck lab-BMP in 1-2 weeks to 

monitor his progress.  Recommend no further use of nonsteroidal anti-

inflammatories.  Future medications may need to be renally dose.  Consider 

nephrology evaluation as an outpatient to further monitor.





Patient with polycythemia vera.  CBC remained stable.  Recommend follow up with 

hematology to further monitor and treat.  Patient may require phlebotomy in the 

near future to maintain hematocrit less than 45.





Patient with tobacco abuse.  Tobacco cessation education provided.  Will provide

nicotine patch to help patient quit entirely.





Patient with hypertriglyceridemia.  Recommend fish oil 1000 mg twice daily.  

Recommend to recheck lab-fasting lipid panel in 4-6 weeks to monitor his 

progress.  Further adjustment in medication can be addressed by his PCP or 

cardiology.





Patient had slightly elevated tsh at 6.9 and normal free T4 at 0.84.  Patient 

may have underlying subclinical hypothyroidism.  Recommend to recheck repeat tsh

and free T4 in 4-6 weeks. PCP may consideration of initiation of medication if 

lab significantly abnormal.  This can be further addressed by his PCP.





Patient with obesity, BMI 39. Recommend lifestyle modification education.  This 

can be further addressed by his PCP.


Vital Signs/Physical Exam: 














Temp Pulse Resp BP Pulse Ox


 


 96.8 F   52   19   119/75   99 


 


 10/27/20 04:00  10/27/20 04:00  10/27/20 04:00  10/27/20 04:00  10/27/20 04:00








General: Alert, In no apparent distress, Oriented x3, Cooperative


HEENT: Atraumatic


Neck: Supple


Respiratory: Clear to auscultation bilaterally, Normal air movement


Cardiovascular: Normal pulses, Regular rate/rhythm


Gastrointestinal: Normal bowel sounds, Soft and benign, Non-distended, No 

tenderness, No masses, No rebound, No guarding


Musculoskeletal: No erythema, No tenderness, No warmth


Integumentary: No tenderness/swelling, No erythema, No warmth, No cyanosis


Neurological: Normal speech, Normal strength at 5/5 x4 extr, Normal tone, Normal

affect


Laboratory Data at Discharge: 














WBC  6.7 K/uL (4.3-10.9)   10/27/20  03:55    


 


Hgb  14.0 g/dL (13.6-17.9)   10/27/20  03:55    


 


Hct  41.9 % (39.6-49.0)   10/27/20  03:55    


 


Plt Count  138 K/uL (152-406)  L  10/27/20  03:55    


 


PT  19.8 SECONDS (9.5-12.5)  H  10/26/20  15:55    


 


INR  1.70   10/26/20  15:55    


 


Sodium  142 mmol/L (136-145)   10/27/20  03:55    


 


Potassium  4.5 mmol/L (3.5-5.1)   10/27/20  03:55    


 


BUN  20 mg/dL (7-18)  H  10/27/20  03:55    


 


Creatinine  1.51 mg/dL (0.55-1.3)  H  10/27/20  03:55    


 


Glucose  101 mg/dL ()   10/27/20  03:55    


 


Magnesium  2.1 mg/dL (1.8-2.4)   10/27/20  03:55    


 


Total Bilirubin  1.0 mg/dL (0.2-1.0)   10/26/20  15:55    


 


AST  38 U/L (15-37)  H  10/26/20  15:55    


 


ALT  69 U/L (12-78)   10/26/20  15:55    


 


Alkaline Phosphatase  73 U/L ()   10/26/20  15:55    


 


Troponin I  0.02 ng/mL (0.0-0.045)   10/27/20  03:55    


 


Triglycerides  247 mg/dL (<150)  H  10/27/20  03:55    


 


Cholesterol  127 mg/dL (<200)   10/27/20  03:55    


 


HDL Cholesterol  20 mg/dL (40-60)  L  10/27/20  03:55    


 


Cholesterol/HDL Ratio  6.35   10/27/20  03:55    








Home Medications: 








Anastrozole [Arimidex*] 1 mg PO SEECOM 10/27/20 


Apixaban [Eliquis *] 5 mg PO BID 10/27/20 


Flecainide [Tambocor*] 100 mg PO Q12HR 10/27/20 


Furosemide [Lasix] 40 mg PO BID #60 10/27/20 


Metoprolol Tartrate [Lopressor] 100 mg PO BID 10/27/20 


Nicotine [Nicoderm] 1 patch TD DAILY #30 patch.td24 10/27/20 


Potassium Oral Tab [Klor-Con 10 mEq Tab*] 10 meq PO BID 10/27/20 





New Medications: 


Furosemide [Lasix] 40 mg PO BID #60


Nicotine [Nicoderm] 1 patch TD DAILY #30 patch.td24


Patient Discharge Instructions: 1.  Patient will need to establish care with a 

PCP to follow up this hospitalization.  2.  Patient presented with dyspnea and 

edema to the lower extremities secondary to acute on chronic diastolic CHF.  X-

ray of revealed volume overload.  Patient was admitted for further evaluation 

and treatment.  Patient received IV diuretic therapy.  CHF education provided.  

Fluid restriction and salt restriction address in detail.  Echocardiogram 

performed.  Ejection fraction within normal range.  Repeat chest x-ray showed 

improvement.  Cardiac enzymes unremarkable.  Cardiology was consulted for 

further recommendation.  At discharge patient without significant shortness of 

breath.  Edema has significantly resolved.  At discharge patient will continue 

with a 1500 cc per day fluid restriction and low-salt diet.  Patient should 

monitor his weight daily.  If his weight increases by more than 5 lb he is to 

contact cardiology or PCP to further monitor and adjust his medication.  

Adjustment in Lasix will be provided.  At discharge patient will continue with 

Lasix 40 mg 1 pill twice daily along with potassium supplementation twice daily.

  Further adjustment in medication may be required.  This can be done with the 

help of his PCP or cardiology.  Education on CHF provided.  Recommend follow up 

with cardiology in 1-2 weeks to follow up this hospitalization.  3.  Patient 

with history of atrial fibrillation on chronic anti coagulation therapy and 

hypertension.  This has remained stable.  At discharge patient will continue 

with his current medications including Eliquis 5 mg twice daily, flecainide 100 

mg twice daily and metoprolol 100 mg twice daily.  Recommend follow up with card

iology to further monitor and adjust medication.  4.  Patient with history of 

obstructive sleep apnea.  Patient uses CPAP at night.  Recommend to continue 

with CPAP.  Recommend follow up with pulmonology as an outpatient to further 

monitor and adjust his treatment.  5.  Patient with chronic renal disease stage 

III.  This has remained stable.  Patient on diuretic therapy.  Recommend to 

recheck lab-BMP in 1-2 weeks to monitor his progress.  Recommend no further use 

of nonsteroidal anti-inflammatories.  Future medications may need to be renally 

dose.  Consider nephrology evaluation as an outpatient to further monitor.  6.  

Patient with polycythemia vera.  CBC remained stable.  Recommend follow up with 

hematology to further monitor and treat.  Patient may require phlebotomy in the 

near future to maintain hematocrit less than 45.  7.  Patient with tobacco 

abuse.  Tobacco cessation education provided.  Will provide nicotine patch to 

help patient quit entirely.  8.  Patient with hypertriglyceridemia.  Recommend 

fish oil 1000 mg twice daily.  Recommend to recheck lab-fasting lipid panel in 

4-6 weeks to monitor his progress.  Further adjustment in medication can be 

addressed by his PCP or cardiology.  9.  Patient had slightly elevated tsh at 

6.9 and normal free T4 at 0.84.  Patient may have underlying subclinical 

hypothyroidism.  Recommend to recheck repeat tsh and free T4 in 4-6 weeks. PCP 

may consideration of initiation of medication if lab significantly abnormal.  

This can be further addressed by his PCP.  10.  Patient with obesity, BMI 39. 

Recommend lifestyle modification education.  This can be further addressed by 

his PCP.


Diet: AHA (1500 cc per day fluid restriction and low salt diet)


Activity: Ad vilma


Followup: 


Unknown,U [Primary Care Provider] - 


Time spent managing pt's care (in minutes): 55

## 2020-10-27 NOTE — ECHO
HEIGHT: 6 ft 2 in   WEIGHT: 305 lb 12.8 oz   DATE OF STUDY: 10/27/2020   REFER DR: Antonio Regalado DO

2-DIMENSIONAL: YES

     M.MODE: YES

 DOPPLER: YES

COLOR FLOW: YES



                    TDS:  NO

PORTABLE: NO

 DEFINITY:  NO

BUBBLE STUDY: NO





DIAGNOSIS:  DYSPNEA



CARDIAC HISTORY:  

CATHERIZATION: YES

SURGERY: NO

PROSTHETIC VALVE: NO

PACEMAKER: NO





MEASUREMENTS (cm)

    DIASTOLIC (NORMALS)                 SYSTOLIC (NORMALS)

IVSd                 1.3 (0.6-1.2)                    LA Diam 4.9 (1.9-4.0)     LVEF       
  51%  

LVIDd               5.6 (3.5-5.7)                        LVIDs      4.2 (2.0-3.5)     %FS  
        26%

LVPWd             1.4 (0.6-1.2)

Ao Diam           4.0 (2.0-3.7)



2 DIMENSIONAL ASSESSMENT:

RIGHT ATRIUM:                   NORMAL

LEFT ATRIUM:       DILATED



RIGHT VENTRICLE:            NORMAL

LEFT VENTRICLE: LEFT VENTRICULAR HYPERTROPHY



TRICUSPID VALVE:             NORMAL

MITRAL VALVE:     NORMAL



PULMONIC VALVE:             NORMAL

AORTIC VALVE:     SCLEROSIS



PERICARDIAL EFFUSION: NONE

AORTIC ROOT:      NORMAL





LEFT VENTRICULAR WALL MOTION:     NORMAL EJECTION FRACTION.



DOPPLER/COLOR FLOW:     MILD TRICUSPID REGURGITATION.



COMMENTS:      NORMAL EJECTION FRACTION. LEFT VENTRICULAR HYPERTROPHY. DECREASED LEFT 
VENTRICULAR COMPLIANCE. AORTIC SCLEROSIS WITH NO STENOSIS. LEFT ATRIAL ENLARGEMENT.



TECHNOLOGIST:   RAFA HARRIS

## 2020-10-29 NOTE — CON
Date of Consultation:  10/27/2020



Reason For Consultation:  Congestive heart failure.



History Of Present Illness:  Mr. Douglas is a 60-year-old male who has a history of coronary artery dise
ase status post intervention in 2013, has had a history of polycythemia vera, hypertension, chronic a
trial fibrillation, who came in with symptoms consistent with congestive heart failure.  He came in w
ith shortness of breath, dizziness, and weakness.  He denied chest pain.  Denied any nausea, vomiting
, diaphoresis.  Denied any fever or chills.  Has already diuresed well since he has been here and is 
feeling better.



Past Medical History:  As stated above.



Allergies:  IODINE.



Review of Systems:

Negative.



Social History:  Negative.



Family History:  Negative.



Medications:  At home include flecainide, Eliquis, metoprolol, and Lasix.



Physical Examination:

General:  He weighed 306 pounds according to the chart. 

Vital Signs:  Stable.  He was in atrial fibrillation, rate controlled.  O2 saturation was 99% on CPAP
. 

HEENT:  Negative. 

Neck:  Supple with no bruit. 

Chest:  Reveals some rales both bases. 

Cardiac Exam:  Revealed atrial fibrillation.  No murmurs, gallops, or rubs. 

Abdomen:  Obese, but benign. 

Extremities:  Revealed no clubbing or cyanosis.  He had 1+ edema.



Diagnostic Data:  EKG showed atrial fibrillation.  Chest x-ray showed cardiomegaly.  Echocardiogram i
n 2015 was normal.  He had a creatinine of 1.51.  His TSH was 6.9.  His BNP was 3672.  His troponin w
as negative.



Impression And Plan:  Acute on chronic diastolic congestive heart failure.  Echocardiogram that was d
one on 10/26/2020 revealed normal ejection fraction, left ventricular hypertrophy, decreased left galina
tricular compliance, aortic sclerosis without any stenosis.  Left atrial enlargement.  The patient is
 on appropriate therapy including Eliquis, flecainide, metoprolol, and Lasix.  He is also receiving s
ome inhalers and is feeling better and, as far as I am concerned, he can go home and follow up with m
e in the near future.  He definitely needs to have an outpatient Lexiscan and an appointment.  He has
 not had a stress test for many years since his stent.  His other issues include chronic atrial fibri
llation, on Eliquis and flecainide.  He has hypertension that is well controlled on metoprolol.  He h
as a history of polycythemia and is allergic to iodine, which we should consider if we need to do ano
ther catheterization down the road.





NB/JAQUELINE

DD:  10/29/2020 03:42:16Voice ID:  [id number]

DT:  10/29/2020 06:51:21Report ID:  [id number]

## 2020-12-14 NOTE — EDPHYS
Physician Documentation                                                                           

 [facility]                                                                 

Name: Naren Douglas                                                                                  

Age: 60 yrs                                                                                       

Sex: Male                                                                                         

: 1960                                                                                   

MRN: [id number]                                                                                   

Arrival Date: 2020                                                                          

Time: 06:55                                                                                       

Account#: [id number]                                                                            

Bed 7                                                                                             

Private MD: Allan Vazquez T                                                                        

ED Physician Jairon Rosas                                                                      

HPI:                                                                                              

                                                                                             

07:15 This 60 yrs old  Male presents to ER via [facility] with complaints of         ps1 

      Shortness Of Breath, Nausea/Vomiting.                                                       

07:15 patient has a hx of CHF, Afib on Eliquis managed by Americo. Presenting with flu like   ps1 

      illness associated with fever, chills, TERA, NV. Onset was a couple of days ago. Had EMS     

      check him out this morning and SP02 reportedly in upper 80's. Takes lasix. Trace edema.     

      Slight chest pressure last night that has resolved. .                                       

                                                                                                  

Historical:                                                                                       

- Allergies:                                                                                      

07:22 Iodinated Contrast Media - IV Dye;                                                      iw  

11:01 Iodine;                                                                                 jl7 

- Home Meds:                                                                                      

07:22 flecainide 100 mg oral tab 1 tab every 12 hours [Active]; metoprolol tartrate 50 mg     iw  

      Oral tab 1 tab 2 times per day [Active]; Eliquis 5 mg oral tab 1 tab 2 times per day        

      [Active]; furosemide 40 mg Oral tab 1 tab once daily [Active];                              

- PMHx:                                                                                           

07:22 Atrial Fib; Hypertension; Polycythemia;                                                 iw  

- PSHx:                                                                                           

07:22 stent ; Appendectomy; knee sx; Hernia repair;                                       iw  

                                                                                                  

- Immunization history:: Adult Immunizations unknown.                                             

- Social history:: Smoking status: Patient reports the use of cigarette tobacco                   

  products, smokes one-half pack cigarettes per day.                                              

                                                                                                  

                                                                                                  

ROS:                                                                                              

07:15 Neck: Negative for injury, pain, and swelling.                                          ps1 

07:15 MS/Extremity: Negative for injury and deformity, Skin: Negative for injury, rash, and       

      discoloration, Psych: Negative for depression, anxiety, suicide ideation, homicidal         

      ideation, and hallucinations.                                                               

07:15 Constitutional: Positive for body aches, chills, fatigue, fever.                            

07:15 ENT: Positive for sinus congestion, sinus pain.                                             

07:15 Cardiovascular: Positive for chest pain.                                                    

07:15 Respiratory: Positive for shortness of breath.                                              

07:15 Abdomen/GI: Positive for nausea and vomiting.                                               

07:15 Neuro: Positive for dizziness.                                                              

                                                                                                  

Exam:                                                                                             

07:15 Constitutional:  This is a well developed, well nourished patient who is awake, alert,  ps1 

      and in no acute distress. Head/Face:  Normocephalic, atraumatic. Eyes:  Pupils equal        

      round and reactive to light, extra-ocular motions intact.  Lids and lashes normal.          

      Conjunctiva and sclera are non-icteric and not injected. Chest/axilla:  Normal chest        

      wall appearance and motion.  Nontender with no deformity.  No lesions are appreciated.      

      Cardiovascular:  Regular rate and rhythm.  No gallops, murmurs, or rubs.  Normal PMI,       

      no JVD.  No pulse deficits. Respiratory:  Lungs have equal breath sounds bilaterally,       

      clear to auscultation and percussion.  No rales, rhonchi or wheezes noted.  No              

      increased work of breathing, no retractions or nasal flaring. Abdomen/GI:  Soft,            

      non-tender, with normal bowel sounds.  No distension or tympany.  No guarding or            

      rebound.  No evidence of tenderness throughout. MS/ Extremity:  Pulses equal, no            

      cyanosis.  Neurovascular intact.  Full, normal range of motion. Neuro:  Awake and           

      alert, GCS 15, oriented to person, place, time, and situation.  Cranial nerves II-XII       

      grossly intact. Sensory grossly intact. Psych:  Awake, alert, with orientation to           

      person, place and time.  Behavior, mood, and affect are within normal limits.               

                                                                                                  

Vital Signs:                                                                                      

07:23 Pulse 59; Resp 18 S; Pulse Ox 100% on R/A;                                              iw  

07:45  / 79; Pulse 58; Resp 19; Temp 98.6; Pulse Ox 99% ; Pain 7/10;                    jl7 

10:22  / 70; Pulse 60; Resp 19; Pulse Ox 98% ;                                          jl7 

12:41  / 68; Pulse 53; Resp 17; Pulse Ox 98% ;                                          jl7 

                                                                                                  

MDM:                                                                                              

07:18 Differential diagnosis: CHF exacerbation, Myocardial Infarction pneumonia, pulmonary    ps1 

      edema, Pulmonary Embolism FLU, COVID, Infection NOS, Viral syndrome, and others.            

07:22 Patient medically screened.                                                             ps1 

                                                                                                  

                                                                                             

07:08 Order name: CBC with Diff; Complete Time: 08:11                                         ps1 

                                                                                             

07:08 Order name: CMP; Complete Time: 08:11                                                   ps1 

                                                                                             

07:08 Order name: Flu; Complete Time: 08:29                                                   ps1 

                                                                                             

07:08 Order name: BNP; Complete Time: 08:11                                                   ps1 

                                                                                             

07:08 Order name: Troponin (emerg Dept Use Only); Complete Time: 08:11                        ps1 

                                                                                             

07:47 Order name: Urine Dipstick--Ancillary (enter results); Complete Time: 09:10             bd  

                                                                                             

11:19 Order name: Protime (+INR)                                                              EDMS

                                                                                             

11:19 Order name: PTT, Activated Partial Thromb                                               EDMS

                                                                                             

11:19 Order name: CBC with Automated Diff                                                     EDMS

                                                                                             

11:19 Order name: CBC with Automated Diff                                                     EDMS

                                                                                             

11:19 Order name: Comprehensive Metabolic Panel                                               EDMS

                                                                                             

11:19 Order name: Comprehensive Metabolic Panel                                               EDMS

                                                                                             

11:19 Order name: Lipid Profile                                                               EDMS

                                                                                             

07:08 Order name: Urine Dipstick-Ancillary (obtain specimen); Complete Time: 07:45            ps1 

                                                                                             

08:13 Order name: EKG Electrocardiogram; Complete Time: 08:18                                 EDMS

                                                                                             

08:30 Order name: CXR XRAY; Complete Time: 09:10                                              ps1 

                                                                                             

11:19 Order name: CONS Physician Consult                                                      EDMS

                                                                                             

11:19 Order name: Heart Healthy                                                               EDMS

                                                                                             

11:19 Order name: Lipid Profile                                                               EDMS

                                                                                             

11:19 Order name: Magnesium                                                                   EDMS

                                                                                             

11:19 Order name: Magnesium                                                                   EDMS

                                                                                             

11:19 Order name: NT PRO-BNP                                                                  EDMS

                                                                                             

11:19 Order name: NT PRO-BNP                                                                  EDMS

                                                                                             

11:19 Order name: Phosphorus                                                                  EDMS

                                                                                             

11:19 Order name: Phosphorus                                                                  EDMS

                                                                                             

11:31 Order name: SARS-COV-2 RT PCR; Complete Time: 11:58                                     EDMS

                                                                                             

07:08 Order name: EKG - Nurse/Tech; Complete Time: 07:29                                      ps1 

                                                                                                  

EC:39 Rate is 63 beats/min. Rhythm is irregular. QRS Axis is Normal. QRS interval is normal.  ps1 

      QT interval is normal. Q waves are Old. T waves are Normal. No ST changes noted.            

      Clinical impression: Atrial Fibrillation. Interpreted by me.                                

                                                                                                  

Administered Medications:                                                                         

07:45 Drug: Acetaminophen 1000 mg Route: PO;                                                  jl7 

08:15 Follow up: Response: No adverse reaction; No change in condition                        sv  

09:50 Drug: Zofran (Ondansetron) 4 mg Route: IVP; Site: right antecubital;                    vg1 

10:15 Follow up: Response: No adverse reaction; Nausea is decreased                           jl7 

10:46 Not Given (Physician Discretion): NS 0.9% 500 ml IV at bolus once                       jl7 

                                                                                                  

                                                                                                  

Disposition:                                                                                      

20 10:55 Hospitalization ordered by Alvaro Chang for Observation. Preliminary             

  diagnosis is Shortness of breath.                                                               

- Bed requested for Intensive Care Unit.                                                          

- Status is Observation.                                                                      vg1 

- Condition is Fair.                                                                              

- Problem is new.                                                                                 

- Symptoms are unchanged.                                                                         

                                                                                                  

                                                                                                  

                                                                                                  

Signatures:                                                                                       

Dispatcher [employer]                           EDMS                                                 

Nishi Braxton, RN                     RN   iw                                                   

Bar Gonzalez RN                        RN   jl7                                                  

Jairon Rosas MD MD   ps1                                                  

Susan Ulrich RN RN   vg1                                                  

Chante Baer RN   sv                                                   

                                                                                                  

Corrections: (The following items were deleted from the chart)                                    

10:42 07:09 CORONAVIRUS+MR.LAB.BRZ ordered. EDMS                                              EDMS

12:18 10:55 Hospitalization Ordered by Alvaro Chang MD for Observation. Preliminary          iw  

      diagnosis is Shortness of breath. Bed requested for Telemetry/MedSurg (observation).        

      Status is Observation. Condition is Fair. Problem is new. Symptoms are unchanged. ps1       

12:53 12:18 2020 10:55 Hospitalization Ordered by Alvaro Chang MD for Observation.     vg1 

      Preliminary diagnosis is Shortness of breath. Bed requested for Intensive Care Unit.        

      Status is Observation. Condition is Fair. Problem is new. Symptoms are unchanged. iw        

                                                                                                  

**************************************************************************************************

## 2020-12-14 NOTE — RAD REPORT
EXAM DESCRIPTION:  [place] Single View12/14/2020 8:49 am

 

CLINICAL HISTORY:  Shortness of breath

 

COMPARISON:  October 2020

 

FINDINGS:   Minimal bilateral pulmonary opacities.

 

Heart remains enlarged

 

IMPRESSION:  Minimal bilateral interstitial lung opacities likely minimal interstitial pulmonary win
a

## 2020-12-14 NOTE — ER
Nurse's Notes                                                                                     

 [facility]                                                                 

Name: Naren Douglas                                                                                  

Age: 60 yrs                                                                                       

Sex: Male                                                                                         

: 1960                                                                                   

MRN: [id number]                                                                                   

Arrival Date: 2020                                                                          

Time: 06:55                                                                                       

Account#: [id number]                                                                            

Bed 7                                                                                             

Private MD: Allan Vazquez T                                                                        

Diagnosis: Shortness of breath                                                                    

                                                                                                  

Presentation:                                                                                     

                                                                                             

07:14 Chief complaint: Patient states: pt has been achy, weak, vomiting, SOB, not feeling     iw  

      well since Friday. Coronavirus screen: shortness of breath, vomiting. Client presents       

      with at least one sign or symptom that may indicate coronavirus-19. Standard/surgical       

      mask placed on the client. Provider contacted for isolation considerations. Ebola           

      Screen: Patient negative for fever greater than or equal to 101.5 degrees Fahrenheit,       

      and additional compatible Ebola Virus Disease symptoms Patient denies exposure to           

      infectious person. Patient denies travel to an Ebola-affected area in the 21 days           

      before illness onset. No symptoms or risks identified at this time. Initial Sepsis          

      Screen: Does the patient meet any 2 criteria? No. Patient's initial sepsis screen is        

      negative. Does the patient have a suspected source of infection? No. Patient's initial      

      sepsis screen is negative. Risk Assessment: Do you want to hurt yourself or someone         

      else? Patient reports no desire to harm self or others. Onset of symptoms was 2020.                                                                                   

07:14 Method Of Arrival: Wheelchair                                                           iw  

07:14 Acuity: ELEANOR 3                                                                           iw  

                                                                                                  

Historical:                                                                                       

- Allergies:                                                                                      

07:22 Iodinated Contrast Media - IV Dye;                                                      iw  

11:01 Iodine;                                                                                 jl7 

- Home Meds:                                                                                      

07:22 flecainide 100 mg oral tab 1 tab every 12 hours [Active]; metoprolol tartrate 50 mg     iw  

      Oral tab 1 tab 2 times per day [Active]; Eliquis 5 mg oral tab 1 tab 2 times per day        

      [Active]; furosemide 40 mg Oral tab 1 tab once daily [Active];                              

- PMHx:                                                                                           

07:22 Atrial Fib; Hypertension; Polycythemia;                                                 iw  

- PSHx:                                                                                           

07:22 stent ; Appendectomy; knee sx; Hernia repair;                                       iw  

                                                                                                  

- Immunization history:: Adult Immunizations unknown.                                             

- Social history:: Smoking status: Patient reports the use of cigarette tobacco                   

  products, smokes one-half pack cigarettes per day.                                              

                                                                                                  

                                                                                                  

Screenin:20 Abuse screen: Denies threats or abuse. Denies injuries from another. Nutritional        jl7 

      screening: No deficits noted. Tuberculosis screening: No symptoms or risk factors           

      identified. Fall Risk IV access (20 points). Total Wilkerson Fall Scale indicates No Risk       

      (0-24 pts).                                                                                 

                                                                                                  

Assessment:                                                                                       

07:20 General: Appears in no apparent distress. uncomfortable, Behavior is calm, cooperative, jl7 

      appropriate for age. Pain: Complains of pain in HA Pain currently is 7 out of 10 on a       

      pain scale. Quality of pain is described as throbbing, Pain began 2-3 days ago. Is          

      continuous. Neuro: Level of Consciousness is awake, alert, obeys commands, Oriented to      

      person, place, time, situation. Cardiovascular: Patient's skin is warm and dry. Rhythm      

      is atrial fibrillation. Respiratory: Airway is patent Respiratory effort is even,           

      unlabored, Respiratory pattern is regular, symmetrical, audible wheezes noted. GI:          

      Reports nausea, vomiting. Derm: Skin is pink, warm \T\ dry.                                 

10:40 Reassessment: Dr. Rosas at bedside discussing results and POC.                         jl7 

                                                                                                  

Vital Signs:                                                                                      

07:23 Pulse 59; Resp 18 S; Pulse Ox 100% on R/A;                                              iw  

07:45  / 79; Pulse 58; Resp 19; Temp 98.6; Pulse Ox 99% ; Pain 7/10;                    jl7 

10:22  / 70; Pulse 60; Resp 19; Pulse Ox 98% ;                                          jl7 

12:41  / 68; Pulse 53; Resp 17; Pulse Ox 98% ;                                          jl7 

                                                                                                  

ED Course:                                                                                        

06:55 Patient arrived in ED.                                                                  am2 

06:55 Gonzalo Tinsley MD is Private Physician.                                                 am2 

06:55 Allan Vazquez MD is Private Physician.                                                   am2 

07:06 Jairon Rosas MD is Attending Physician.                                             ps1 

07:09 Bar Gonzalez, RN is Primary Nurse.                                                      jl7 

07:10 EKG done, by ED staff, reviewed by Jairon Rosas MD.                                   dh3 

07:20 Triage completed.                                                                       iw  

07:20 Arm band placed on.                                                                     iw  

07:20 Patient has correct armband on for positive identification. Bed in low position. Call   7 

      light in reach. Side rails up X 1. Cardiac monitor on. Pulse ox on. NIBP on.                

07:20 Initial lab(s) drawn, by me, sent to lab. Urine collected: clean catch specimen, clear, jl7 

      COVID swab sent to lab. Flu and/or RSV swab sent to lab. Inserted saline lock: 20 gauge     

      in right antecubital area, using aseptic technique. Blood collected.                        

08:49 CXR XRAY In Process Unspecified.                                                        EDMS

10:55 Alvaro Chang MD is Hospitalizing Provider.                                           ps1 

12:41 No provider procedures requiring assistance completed. Patient admitted, IV remains in  jl7 

      place. intact, No redness/swelling at site.                                                 

                                                                                                  

Administered Medications:                                                                         

07:45 Drug: Acetaminophen 1000 mg Route: PO;                                                  jl7 

08:15 Follow up: Response: No adverse reaction; No change in condition                        sv  

09:50 Drug: Zofran (Ondansetron) 4 mg Route: IVP; Site: right antecubital;                    vg1 

10:15 Follow up: Response: No adverse reaction; Nausea is decreased                           jl7 

10:46 Not Given (Physician Discretion): NS 0.9% 500 ml IV at bolus once                       jl7 

                                                                                                  

                                                                                                  

Outcome:                                                                                          

10:55 Decision to Hospitalize by Provider.                                                    ps1 

12:41 Admitted to ICU accompanied by tech, via wheelchair, room 2, with chart, Report called  jl7 

      to  SANJU Hayes                                                                                 

12:41 Condition: stable                                                                           

12:41 Discharge instructions given to patient, Instructed on the need for admit, Demonstrated     

      understanding of instructions.                                                              

12:53 Patient left the ED.                                                                    vg1 

                                                                                                  

Signatures:                                                                                       

Dispatcher [employer]                           EDChante Galan, RN                    SANJU   sv                                                   

Nishi Braxton, RN                     SANJU   iw                                                   

Bar Gonzalez RN RN   jl7                                                  

Анна Miranda                               Mary Salgado                              [facility]                                                  

Jairon Rosas MD MD ps1 Garcia, Victoria RN                    RN   vg1                                                  

                                                                                                  

**************************************************************************************************

## 2020-12-15 NOTE — P.HP
Certification for Inpatient


Patient admitted to: Inpatient


With expected LOS: >2 Midnights


Patient will require the following post-hospital care: None


Practitioner: I am a practitioner with admitting privileges, knowledge of 

patient current condition, hospital course, and medical plan of care.


Services: Services provided to patient in accordance with Admission requirements

found in Title 42 Section 412.3 of the Code of Federal Regulations





Patient History


Date of Service: 20


History of Present Illness: 


Patient is a 60-year-old gentleman who came to the hospital with shortness of 

breath along with nausea and vomiting.  Patient has been getting more short of 

breath over the last few days.  He follows up with Cardiology and has a history 

of congestive heart failure with an ejection fraction that has worsened from 70%

to 50% over the last few years.  He has poor compliance of the left ventricle on

echocardiogram.  He decided to come into the emergency room because he was not 

feeling better.  He has been having fever shakes and chills as well.  He has 

dyspnea on exertion as well as tachypnea on slight movement.  His blood pressure

has also been fluctuating quite a bit.  He had bilateral lower extremity edema. 

Blood testing revealed that he did have COVID-19 pneumonia as well.  Patient 

with bilateral opacities on the chest x-ray which is probably causing his e

xacerbation of his CHF.  BNP is elevated as well.  Patient will be admitted to 

the hospital for further evaluation.  


Allergies





iodine Allergy (Severe, Verified 10/26/20 20:29)


   Anaphylaxis


codeine Allergy (Intermediate, Verified 10/26/20 20:29)


   Itching





Home Medications: 








Apixaban [Eliquis *] 5 mg PO BID 10/27/20 


Flecainide [Tambocor*] 100 mg PO Q12HR 10/27/20 


Furosemide [Lasix] 40 mg PO DAILY 20 


Levothyroxine Sodium [Levothyroxine] 1 tab PO BEDTIME 20 


Metoprolol Tartrate 1 tab PO BID 20 








- Past Medical/Surgical History


Has patient received pneumonia vaccine in the past: Yes


Diabetic: No


-: HTN


-: Hyperlipidemia


-: Atrial fibrillation, on chronic anti coagulation therapy


-: CAD, Previous stent


-: Obesity


-: Obstructive sleep apnea


-: Tobacco abuse


-: NIDDM


-: Cardiac stent 


-: Bilateral knee arthroscopic surgery


-: Appendectomy


-: Hernia repair


-: hernia repair


Psychosocial/ Personal History: patient is 





- Family History


  ** Father


Medical History: Cancer


Notes: , colon rectal ca





  ** Mother


Medical History: Heart disease, Hypertension


Notes: 





  ** Brother


Medical History: Diabetes





- Social History


Smoking Status: Current every day smoker


Alcohol use: No


CD- Drugs: No


Caffeine use: No


Place of Residence: Home





Review of Systems


10-point ROS is otherwise unremarkable





Physical Examination





- Vital Signs


Temperature: 96.9 F


Blood Pressure: 160/110


Pulse: 80


Respirations: 20


Pulse Ox (%): 99





- Physical Exam


General: Alert, In no apparent distress, Oriented x3


HEENT: Atraumatic, PERRLA, Mucous membr. moist/pink, EOMI, Sclerae nonicteric


Neck: Supple, 2+ carotid pulse no bruit, No LAD, Without JVD or thyroid 

abnormality


Respiratory: Diminished, Crackles/rales, Rhonchi/gurgles


Cardiovascular: Regular rate/rhythm, Normal S1 S2, Systolic murmur


Gastrointestinal: Normal bowel sounds, Soft and benign, Non-distended, No 

tenderness


Musculoskeletal: No clubbing, No tenderness, Swelling


Integumentary: No rashes


Neurological: Normal gait, Normal speech, Normal strength at 5/5 x4 extr, Normal

 tone, Sensation intact, Cranial nerves 3-12 intact, Normal affect


Lymphatics: No axilla or inguinal lymphadenopathy





- Studies


Microbiology Data (last 24 hrs): 








20 07:26   Nasopharnyx   Influenza Type A Antigen Screen - Final


20 07:26   Nasopharnyx   Influenza Type B Antigen Screen - Final








Assessment & Plan





- Problems (Diagnosis)


(1) Pneumonia due to COVID-19 virus


Current Visit: Yes   Status: Acute   





(2) Acute exacerbation of CHF (congestive heart failure)


Current Visit: Yes   Status: Acute   





(3) Malignant diastolic hypertension with CHF, NYHA class 3


Current Visit: Yes   Status: Acute   





- Plan





1. Echocardiogram reviewed


2. Continue with ARB


3. Continue with Beta blocker


4. Cardiology consultation if condition progressively worsens


5. Aggressive diuresis


6. Strict I's and O's


7. Repeat CXR


8. Daily weights


9. Education regarding diet and treatment of congestive heart failure


10. May start remdesivir if condition worsens


11. Strict BP control


12. O2 per protocol


13. Continue with albuterol inhaler therapy; IV dexamethasone; zinc and vitamin-

C


14. Repeat labs including D-dimer, ferritin, and CRP and LFTs


15. GI and DVT prophylaxis


   


Discharge Plan: Home


Plan to discharge in: Greater than 2 days





- Advance Directives


Does patient have a Living Will: No


Does patient have a Durable POA for Healthcare: No





- Code Status/Comfort Care


Code Status Assessed: Yes


Code Status: Full Code


Critical Care: No


Time Spent Managing PTS Care (In Minutes): 45

## 2020-12-16 NOTE — RAD REPORT
EXAM DESCRIPTION:  RAD - Chest Single View - 12/16/2020 10:43 am

 

CLINICAL HISTORY:  COVID pneumonia; more hypoxic

Chest pain.

 

COMPARISON:  Chest Single View dated 12/14/2020; Chest Pa And Lat (2 Views) dated 10/27/2020; Chest S
[place] View dated 10/26/2020; Chest Single View dated 10/3/2020

 

FINDINGS:  Portable technique limits examination quality.

 

The lungs are grossly clear. The heart is upper limit of normal in size. No displaced fractures.

## 2020-12-16 NOTE — CON
Date of Consultation:  12/15/2020



Reason For Consultation:  Congestive heart failure.



History Of Present Illness:  The patient is a 60-year-old white male.  He is very well known to me fr
om office visit and admission.  He has paroxysmal atrial fibrillation for which he takes flecainide a
s well as Eliquis and metoprolol.  He has a history of hypertension.  He has a history of coronary ar
patricia disease status post stent in 2013.  He has polycythemia with occasional phlebotomy, had just rec
ently seen Hematology for that purpose, but they decided that his hemoglobin was not high enough for 
phlebotomy.  Came in with shortness of breath, nausea, vomiting, was found to be COVID positive.  He 
denied chest pain.  Denied any PND, orthopnea, pedal edema, palpitations, or syncope.  Denied any fev
er or chills.



Past Medical History:  As stated above.



Allergies:  HE IS ALLERGIC TO IODINE AND CODEINE.



Medications:  At home include Eliquis, flecainide, Lasix, metoprolol, and Synthroid.



Family History:  Noncontributory.



Social History:  Noncontributory.



Review of Systems:

Negative.



Physical Examination:

Vital Signs:  He was in atrial fibrillation, rate of 74.  He weighed 292 pounds.  Blood pressure is 1
50/80, O2 saturation was 97% on CPAP and BiPAP. 

HEENT:  Negative. 

Neck:  Supple.  No bruit. 

Chest:  Clear. 

Cardiac:  Revealed atrial fibrillation. 

Abdomen:  Obese, but benign. 

Extremities:  Revealed trace edema.



Diagnostic Data:  Include a white count of 2.7, platelet count of 106.  Echocardiogram in October of 2020 showed normal ejection fraction with diastolic dysfunction.  Chest x-ray showed minimal edema.



Impression And Plan:  

1.Acute on chronic diastolic congestive heart failure.  Patient is receiving Lasix IV.

2.COVID positive.  He is on antibiotics and steroids.

3.Atrial fibrillation, paroxysmal, on flecainide, metoprolol, and Eliquis, and we will continue all 
that.

4.Hypertension, well controlled.

5.History of chronic kidney disease, stable.

6.Hypertension in polycythemia vera, may need some phlebotomy down the road.  He has no cardiac stud
ies ordered or planned at this time and I agree with that.  He just had an echocardiogram in October.
  I will continue with present regimen.  I will see him in the office in the near future after discha
rge.  When he goes home, he should probably go up on his Lasix dose.





NB/JAQUELINE

DD:  12/16/2020 07:22:48Voice ID:  [id number]

DT:  12/16/2020 07:47:24Report ID:  [id number]

## 2020-12-16 NOTE — P.PN
Subjective


Date of Service: 12/15/20





Patient states he feels a little more short of breath today.





Review of Systems


10-point ROS is otherwise unremarkable





Physical Examination





- Vital Signs


Temperature: 97.2 F


Blood Pressure: 133/97


Pulse: 72


Respirations: 20


Pulse Ox (%): 98





- Physical Exam


General: Alert, In no apparent distress, Oriented x3


HEENT: Atraumatic


Neck: Supple, JVD not distended


Respiratory: Other (Patient appears to be more than tachypneic today)


Cardiovascular: Other (Rhythm on the monitor is stable;pulses are stable)


Gastrointestinal: No tenderness, No rebound, No guarding


Musculoskeletal: No clubbing, No swelling, No tenderness


Integumentary: No rashes


Neurological: Normal speech, Normal affect





- Studies


Medications List Reviewed: Yes





Assessment & Plan





- Problems (Diagnosis)


(1) Pneumonia due to COVID-19 virus


Current Visit: Yes   Status: Acute   





(2) Acute exacerbation of CHF (congestive heart failure)


Current Visit: Yes   Status: Acute   





(3) Malignant diastolic hypertension with CHF, NYHA class 3


Current Visit: Yes   Status: Acute   





(4) Polycythemia


Current Visit: Yes   Status: Acute   





- Plan





Plan of care as mentioned below


1. Echocardiogram reviewed


2. Continue with ARB


3. Continue with Beta blocker


4. Cardiology consultation if condition progressively worsens


5. May need to have phlebotomy


6. Strict I's and O's


7. Repeat CXR


8. Daily weights


9. Education regarding diet and treatment of congestive heart failure


10. May start remdesivir if condition worsens


11. Strict BP control


12. O2 per protocol


13. Continue with albuterol inhaler therapy; IV dexamethasone; zinc and vitamin-

C


14. Repeat labs including D-dimer, ferritin, and CRP and LFTs


15. GI and DVT prophylaxis


   


Discharge Plan: Home


Plan to discharge in: 48 Hours





- Advance Directives


Does patient have a Living Will: No


Does patient have a Durable POA for Healthcare: No





- Code Status/Comfort Care


Code Status: Full Code


Critical Care: No


Time Spent Managing PTS Care (In Minutes): 35

## 2020-12-22 NOTE — P.DS
Discharge Date: 12/16/20


Disposition: ROUTINE DISCHARGE


Discharge Condition: GOOD





- Problems


(1) Pneumonia due to COVID-19 virus


Status: Acute   





(2) Acute exacerbation of CHF (congestive heart failure)


Status: Acute   





(3) Malignant diastolic hypertension with CHF, NYHA class 3


Status: Acute   





(4) Polycythemia


Status: Acute   


Brief History of Present Illness: 


Patient is a 60-year-old gentleman who came to the hospital with shortness of 

breath along with nausea and vomiting.  Patient has been getting more short of 

breath over the last few days.  He follows up with Cardiology and has a history 

of congestive heart failure with an ejection fraction that has worsened from 70%

to 50% over the last few years.  He has poor compliance of the left ventricle on

echocardiogram.  He decided to come into the emergency room because he was not 

feeling better.  He has been having fever shakes and chills as well.  He has 

dyspnea on exertion as well as tachypnea on slight movement.  His blood pressure

has also been fluctuating quite a bit.  He had bilateral lower extremity edema. 

Blood testing revealed that he did have COVID-19 pneumonia as well.  Patient 

with bilateral opacities on the chest x-ray which is probably causing his 

exacerbation of his CHF.  BNP is elevated as well.  Patient will be admitted to 

the hospital for further evaluation.  


Hospital Course: 





Patient's respiratory status has improved. Patient is clinically doing much 

better. Patient is oxygenating well. At this time, patient is stable for 

discharge back to home. Patient will continue on steroids along with vitamin C, 

vitamin D, zinc. Patient will return to the Emergency room if patient becomes 

more hypoxic or if his shortness of breath worsens. At this time, patient is 

stable for discharge home. 


Vital Signs/Physical Exam: 














Temp Pulse Resp BP Pulse Ox


 


 97.2 F   72   20   133/97 H  98 


 


 12/22/20 14:06  12/22/20 14:06  12/22/20 14:06  12/22/20 14:06  12/22/20 14:06








General: Alert, In no apparent distress, Oriented x3


Laboratory Data at Discharge: 














WBC  13.5 K/uL (4.3-10.9)  H D 12/16/20  11:27    


 


Hgb  16.5 g/dL (13.6-17.9)   12/16/20  11:27    


 


Hct  50.7 % (39.6-49.0)  H  12/16/20  11:27    


 


Plt Count  153 K/uL (152-406)  D 12/16/20  11:27    


 


PT  13.6 SECONDS (9.5-12.5)  H  12/15/20  04:43    


 


INR  1.16   12/15/20  04:43    


 


APTT  30.5 SECONDS (24.3-36.9)   12/15/20  04:43    


 


Sodium  140 mmol/L (136-145)   12/16/20  11:27    


 


Potassium  4.2 mmol/L (3.5-5.1)   12/16/20  11:27    


 


BUN  22 mg/dL (7-18)  H  12/16/20  11:27    


 


Creatinine  1.17 mg/dL (0.55-1.3)   12/16/20  11:27    


 


Glucose  100 mg/dL ()   12/16/20  11:27    


 


Phosphorus  3.5 mg/dL (2.5-4.9)  D 12/16/20  04:21    


 


Magnesium  2.2 mg/dL (1.8-2.4)   12/16/20  11:27    


 


Total Bilirubin  0.5 mg/dL (0.2-1.0)   12/15/20  04:43    


 


AST  29 U/L (15-37)   12/15/20  04:43    


 


ALT  34 U/L (12-78)   12/15/20  04:43    


 


Alkaline Phosphatase  59 U/L ()   12/15/20  04:43    


 


Triglycerides  215 mg/dL (<150)  H  12/15/20  04:43    


 


Cholesterol  130 mg/dL (<200)   12/15/20  04:43    


 


HDL Cholesterol  26 mg/dL (40-60)  L  12/15/20  04:43    


 


Cholesterol/HDL Ratio  5.00   12/15/20  04:43    








Home Medications: 








Apixaban [Eliquis *] 5 mg PO BID 10/27/20 


Flecainide [Tambocor*] 100 mg PO Q12HR 10/27/20 


Furosemide [Lasix*] 40 mg PO DAILY 12/14/20 


Levothyroxine Sodium [Levothyroxine] 1 tab PO BEDTIME 12/14/20 


Metoprolol Tartrate 1 tab PO BID 12/14/20 


Ascorbic Acid [Vitamin C*] 500 mg PO BID #60 tablet 12/16/20 


Bacitracin/Polym Opth [Polysporin Opth Oint*] 1 appl EACH EYE Q8HR #1 tube 

12/16/20 


Cefdinir [Omnicef] 300 mg PO BID #10 capsule 12/16/20 


Cholecalciferol (Vitamin D3) [Vitamin D 5,000 IU Cap*] 5,000 unit PO DAILY #30 

cap 12/16/20 


Losartan Potassium [Cozaar*] 50 mg PO BID #60 tablet 12/16/20 


Polyethyl Gly 3350 [Glycolax*] 17 gm PO DAILY PRN #10 udbot 12/16/20 


Zinc Sulfate [Zinc Sulfate*] 220 mg PO BID #60 cap 12/16/20 


predniSONE [Prednisone*] 20 mg PO BID #14 tab 12/16/20 





New Medications: 


Losartan Potassium [Cozaar*] 50 mg PO BID #60 tablet


Polyethyl Gly 3350 [Glycolax*] 17 gm PO DAILY PRN #10 udbot


 PRN Reason: Constipation


Cefdinir [Omnicef] 300 mg PO BID #10 capsule


Bacitracin/Polym Opth [Polysporin Opth Oint*] 1 appl EACH EYE Q8HR #1 tube


predniSONE [Prednisone*] 20 mg PO BID #14 tab


Ascorbic Acid [Vitamin C*] 500 mg PO BID #60 tablet


Cholecalciferol (Vitamin D3) [Vitamin D 5,000 IU Cap*] 5,000 unit PO DAILY #30 

cap


Zinc Sulfate [Zinc Sulfate*] 220 mg PO BID #60 cap


Patient Discharge Instructions: OK TO DC IV AND DC HOME.  FOLLOW-UP WITH PRIMARY

CARE PROVIDER IN 1-2 WEEKS.  FOLLOW-UP WITH CARDIOLOGY IN 1-2 WEEKS.  RETURN TO 

THE ER IF SYMPTOMS worsens.  CALL or TEXT DR. CERVANTES -[phone] IF ANY 

QUESTIONS REGARDING HOSPITAL STAY.  PLEASE CALL THE FLOOR -[phone] IF ANY

MEDICATION OR NURSING QUESTIONS.


Diet: AHA


Activity: Fall precautions


Followup: 


Allan Vazquez MD [Primary Care Provider] - 


Time spent managing pt's care (in minutes): 35

## 2021-08-10 ENCOUNTER — HOSPITAL ENCOUNTER (OUTPATIENT)
Dept: HOSPITAL 97 - ER | Age: 61
Setting detail: OBSERVATION
LOS: 1 days | Discharge: HOME | End: 2021-08-11
Attending: HOSPITALIST | Admitting: HOSPITALIST
Payer: COMMERCIAL

## 2021-08-10 VITALS — BODY MASS INDEX: 39.3 KG/M2

## 2021-08-10 VITALS — TEMPERATURE: 98.6 F

## 2021-08-10 VITALS — OXYGEN SATURATION: 97 %

## 2021-08-10 DIAGNOSIS — E66.9: ICD-10-CM

## 2021-08-10 DIAGNOSIS — I25.10: ICD-10-CM

## 2021-08-10 DIAGNOSIS — Z79.01: ICD-10-CM

## 2021-08-10 DIAGNOSIS — Z88.6: ICD-10-CM

## 2021-08-10 DIAGNOSIS — I48.20: ICD-10-CM

## 2021-08-10 DIAGNOSIS — E11.9: ICD-10-CM

## 2021-08-10 DIAGNOSIS — I50.30: ICD-10-CM

## 2021-08-10 DIAGNOSIS — R07.89: Primary | ICD-10-CM

## 2021-08-10 DIAGNOSIS — Z99.81: ICD-10-CM

## 2021-08-10 DIAGNOSIS — F17.210: ICD-10-CM

## 2021-08-10 DIAGNOSIS — G47.33: ICD-10-CM

## 2021-08-10 DIAGNOSIS — Z80.0: ICD-10-CM

## 2021-08-10 DIAGNOSIS — Z91.041: ICD-10-CM

## 2021-08-10 DIAGNOSIS — E78.5: ICD-10-CM

## 2021-08-10 DIAGNOSIS — Z83.3: ICD-10-CM

## 2021-08-10 DIAGNOSIS — Z95.5: ICD-10-CM

## 2021-08-10 DIAGNOSIS — Z20.822: ICD-10-CM

## 2021-08-10 DIAGNOSIS — I48.92: ICD-10-CM

## 2021-08-10 DIAGNOSIS — I11.0: ICD-10-CM

## 2021-08-10 DIAGNOSIS — Z82.49: ICD-10-CM

## 2021-08-10 DIAGNOSIS — D45: ICD-10-CM

## 2021-08-10 LAB
ALBUMIN SERPL BCP-MCNC: 3.9 G/DL (ref 3.4–5)
ALP SERPL-CCNC: 64 U/L (ref 45–117)
ALT SERPL W P-5'-P-CCNC: 38 U/L (ref 12–78)
ANISOCYTOSIS BLD QL: (no result)
AST SERPL W P-5'-P-CCNC: 26 U/L (ref 15–37)
BLD SMEAR INTERP: (no result)
BUN BLD-MCNC: 20 MG/DL (ref 7–18)
GLUCOSE SERPLBLD-MCNC: 115 MG/DL (ref 74–106)
HCT VFR BLD CALC: 46.4 % (ref 39.6–49)
INR BLD: 1.34
LIPASE SERPL-CCNC: 160 U/L (ref 73–393)
LYMPHOCYTES # SPEC AUTO: 0.9 K/UL (ref 0.7–4.9)
MAGNESIUM SERPL-MCNC: 2.1 MG/DL (ref 1.8–2.4)
MORPHOLOGY BLD-IMP: (no result)
NT-PROBNP SERPL-MCNC: 1533 PG/ML (ref ?–125)
PMV BLD: 8.1 FL (ref 7.6–11.3)
POTASSIUM SERPL-SCNC: 4.1 MMOL/L (ref 3.5–5.1)
RBC # BLD: 6.15 M/UL (ref 4.33–5.43)
TROPONIN (EMERG DEPT USE ONLY): < 0.02 NG/ML (ref 0–0.04)

## 2021-08-10 PROCEDURE — 84100 ASSAY OF PHOSPHORUS: CPT

## 2021-08-10 PROCEDURE — 94660 CPAP INITIATION&MGMT: CPT

## 2021-08-10 PROCEDURE — 83690 ASSAY OF LIPASE: CPT

## 2021-08-10 PROCEDURE — 85610 PROTHROMBIN TIME: CPT

## 2021-08-10 PROCEDURE — 80053 COMPREHEN METABOLIC PANEL: CPT

## 2021-08-10 PROCEDURE — 74176 CT ABD & PELVIS W/O CONTRAST: CPT

## 2021-08-10 PROCEDURE — 93925 LOWER EXTREMITY STUDY: CPT

## 2021-08-10 PROCEDURE — 36415 COLL VENOUS BLD VENIPUNCTURE: CPT

## 2021-08-10 PROCEDURE — 76705 ECHO EXAM OF ABDOMEN: CPT

## 2021-08-10 PROCEDURE — 80076 HEPATIC FUNCTION PANEL: CPT

## 2021-08-10 PROCEDURE — 71045 X-RAY EXAM CHEST 1 VIEW: CPT

## 2021-08-10 PROCEDURE — 80048 BASIC METABOLIC PNL TOTAL CA: CPT

## 2021-08-10 PROCEDURE — 93970 EXTREMITY STUDY: CPT

## 2021-08-10 PROCEDURE — 93005 ELECTROCARDIOGRAM TRACING: CPT

## 2021-08-10 PROCEDURE — 83036 HEMOGLOBIN GLYCOSYLATED A1C: CPT

## 2021-08-10 PROCEDURE — 84443 ASSAY THYROID STIM HORMONE: CPT

## 2021-08-10 PROCEDURE — 85025 COMPLETE CBC W/AUTO DIFF WBC: CPT

## 2021-08-10 PROCEDURE — 83735 ASSAY OF MAGNESIUM: CPT

## 2021-08-10 PROCEDURE — 84484 ASSAY OF TROPONIN QUANT: CPT

## 2021-08-10 PROCEDURE — 80061 LIPID PANEL: CPT

## 2021-08-10 PROCEDURE — 83880 ASSAY OF NATRIURETIC PEPTIDE: CPT

## 2021-08-10 PROCEDURE — 84439 ASSAY OF FREE THYROXINE: CPT

## 2021-08-10 PROCEDURE — 71250 CT THORAX DX C-: CPT

## 2021-08-10 NOTE — RAD REPORT
EXAM DESCRIPTION:  US - Extrem Venous W Compress Saúl - 8/10/2021 2:41 pm

 

CLINICAL HISTORY:  discoloration;Pain

Bilateral leg edema and swelling.

 

COMPARISON:  No comparisons

 

TECHNIQUE:  Real-time sonographic interrogation of the left and right lower extremity deep venous sys
tems was performed.

 

FINDINGS:  Normal compressibility, flow augmentation, phasic flow and spontaneous flow is identified 
in both the left and right lower extremity deep venous systems.

 

IMPRESSION:  No sonographic evidence of left or right lower extremity deep venous thrombosis.

## 2021-08-10 NOTE — RAD REPORT
EXAM DESCRIPTION:  US - Lower Extremity Arterial Bilat - 8/10/2021 3:41 pm

 

CLINICAL HISTORY:  discoloration;Pain

Claudication

 

COMPARISON:  No comparisons

 

TECHNIQUE:  Bilateral lower extremity arterial Doppler examination was performed with [place]

 

FINDINGS:  Symmetric brachial pressure measurements are noted.

 

Triphasic and biphasic waveforms are seen throughout both lower extremity arterial systems to the lev
el of the dorsalis pedis arteries.

 

No evidence of a stenosis or occlusion.

 

IMPRESSION:  No evidence of significant peripheral vascular disease.

## 2021-08-10 NOTE — EDPHYS
Physician Documentation                                                                           

 [facility]                                                                 

Name: Naren Douglas                                                                                  

Age: 61 yrs                                                                                       

Sex: Male                                                                                         

: 1960                                                                                   

MRN: [id number]                                                                                   

Arrival Date: 08/10/2021                                                                          

Time: 11:47                                                                                       

Account#: [id number]                                                                            

Bed 11                                                                                            

Private MD:                                                                                       

ED Physician Linden Liu                                                                         

HPI:                                                                                              

08/10                                                                                             

12:33 This 61 yrs old  Male presents to ER via Ambulatory with complaints of Chest   rn  

      Tightness, Abdominal Pain, Back Pain.                                                       

12:33 The patient presents with abdominal pain in the upper abdomen. Onset: The               rn  

      symptoms/episode began/occurred this morning. The symptoms radiate to back. Associated      

      signs and symptoms: Pertinent positives: constipation, nausea, Pertinent negatives:         

      diarrhea, dysuria, fever, testicular pain. The symptoms are described as achy,              

      constant. Modifying factors: The symptoms are alleviated by nothing, the symptoms are       

      aggravated by movement, touching the area. Severity of pain: At its worst the pain was      

      moderate in the emergency department the pain has improved. The patient has not             

      experienced similar symptoms in the past. The patient has not recently seen a               

      physician. Reports this morning began with upper abdominal pain, radiates to back and       

      right flank, associated with nausea and constipation. Denies fever. Reports mild cough      

      and mild shortness of breath. Has already had Covid and then also got first of vaccine      

      doses. No recent trauma. No blood in stool. Reports pain not improving so came in for       

      evaluation..                                                                                

                                                                                                  

Historical:                                                                                       

- Allergies:                                                                                      

12:14 Iodinated Contrast Media - IV Dye;                                                      kg  

12:14 Iodine;                                                                                 kg  

- Home Meds:                                                                                      

12:14 Eliquis 5 mg Oral tab 1 tab 2 times per day [Active]; flecainide 100 mg Oral tab 1 tab  kg  

      every 12 hours [Active]; metoprolol tartrate 50 mg Oral tab 1 tab 2 times per day           

      [Active];                                                                                   

17:19 furosemide 40 mg Oral tab 1 tab 2 times per day [Active]; Klor-Con 10 10 mEq Oral TbER  hb  

      1 tab 2 times per day [Active];                                                             

- PMHx:                                                                                           

12:14 Atrial Fib; Hypertension; Polycythemia;                                                 kg  

                                                                                                  

- Immunization history:: Adult Immunizations up to date, Client reports receiving the             

  1st dose of the Covid vaccine, August 3, 2021 Peter \DANIELA\ Peter .                             

- Social history:: Smoking status: Patient reports the use of cigarette tobacco                   

  products, smokes one pack cigarettes per day. Patient uses alcohol, occasionally.               

- Family history:: not pertinent.                                                                 

- Hospitalizations: : No recent hospitalization is reported.                                      

                                                                                                  

                                                                                                  

ROS:                                                                                              

12:33 Constitutional: Negative for fever, chills, and weight loss, Eyes: Negative for injury, rn  

      pain, redness, and discharge, ENT: Negative for injury, pain, and discharge, Neck:          

      Negative for injury, pain, and swelling, Cardiovascular: Negative for edema                 

      Respiratory: Negative for wheezing, and pleuritic chest pain, Abdomen/GI: Negative for      

      vomiting, diarrhea Back: Negative for injury, + low back pain : Negative for injury,      

      bleeding, discharge, and swelling, MS/Extremity: Negative for injury and deformity,         

      Skin: + mild darkening of both lower ext skin near ankles Neuro: Negative for headache,     

      weakness, numbness, tingling, and seizure.                                                  

12:33 All other systems are negative.                                                             

                                                                                                  

Exam:                                                                                             

12:33 Constitutional:  This is a well developed, well nourished patient who is awake, alert,  rn  

      appears anxious Head/Face:  Normocephalic, atraumatic. Eyes:  Periorbital areas with no     

      swelling, redness, or edema. ENT:  No stridor Cardiovascular:  Irregular rhythm, normal     

      rate.  No pulse deficits. Respiratory:  Mild tachypnea, speaking full sentences.            

      Abdomen/GI:  Soft, mild epigastric tenderness, no masses Skin:  Warm, dry, no erythema      

      or signs of cellulitis of lower extremities, equal circumference.  Skin with mild           

      darkening bilateral distal tib-fib regions, has appearance of more of a brawny edema or     

      venous stasis. MS/ Extremity:  Pulses equal, no cyanosis.  Neurovascular intact.  Full,     

      normal range of motion.  Equal circumference. Neuro:  Awake and alert, GCS 15, oriented     

      to person, place, time, and situation.  Cranial nerves II-XII grossly intact.  Motor        

      strength 5/5 in all extremities.  Sensory grossly intact.                                   

17:56 ECG was reviewed by the Attending Physician.                                            rn  

                                                                                                  

Vital Signs:                                                                                      

12:08  / 96; Pulse 72; Resp 20; Temp 96.7(TE); Pulse Ox 100% on R/A; Weight 131.54 kg   kg  

      (R); Height 6 ft. 2 in. (187.96 cm); Pain 8/10;                                             

15:35  / 97; Pulse 53; Resp 18; Pulse Ox 98% ;                                          hb  

16:19  / 82; Pulse 55; Resp 17; Pulse Ox 98% ;                                          hb  

17:20  / 93; Pulse 56; Resp 17; Pulse Ox 98% ;                                          hb  

18:26  / 81; Pulse 67; Resp 17; Pulse Ox 100% ;                                         hb  

12:08 Body Mass Index 37.23 (131.54 kg, 187.96 cm)                                            kg  

                                                                                                  

MDM:                                                                                              

12:05 Patient medically screened.                                                             rn  

16:44 Differential diagnosis: bowel obstruction, cholecystitis, Cholelithiasis,               rn  

      diverticulitis, gastritis, gastroesophageal reflux disease, myocardia ischemia or           

      infarction, non-specific abd pain, pancreatitis, Peptic Ulcer Disease, Flutter, atrial      

      fibrillation. Data reviewed: vital signs, nurses notes, lab test result(s), EKG,            

      radiologic studies, CT scan, doppler, plain films, and as a result, I will admit            

      patient. Test interpretation: by ED physician or midlevel provider: ECG, plain              

      radiologic studies, Chest x-ray with mild pulmonary edema.. Counseling: I had a             

      detailed discussion with the patient and/or guardian regarding: the historical points,      

      exam findings, and any diagnostic results supporting the discharge/admit diagnosis, lab     

      results, radiology results, the need for further work-up and treatment in the hospital.     

      Response to treatment: the patient's symptoms have mildly improved after treatment, and     

      as a result, I will admit patient. Admission orders: after a detailed discussion of the     

      patient's condition and case, the admit orders are written by me. ED course: Patient        

      with atrial flutter with variable conduction, pulmonary edema on chest x-ray, still         

      feels short of breath. Compliant with Lasix and flecainide. Will admit for diuresis and     

      medication titration if needed..                                                            

                                                                                                  

08/10                                                                                             

12:06 Order name: Basic Metabolic Panel                                                       rn  

08/10                                                                                             

12:06 Order name: CBC with Diff; Complete Time: 13:19                                         rn  

08/10                                                                                             

12:06 Order name: LFT's; Complete Time: 15:12                                                 rn  

08/10                                                                                             

12:06 Order name: Magnesium; Complete Time: 15:12                                             rn  

08/10                                                                                             

12:06 Order name: NT PRO-BNP; Complete Time: 15:12                                            rn  

08/10                                                                                             

12:06 Order name: PT-INR; Complete Time: 13:19                                                rn  

08/10                                                                                             

12:06 Order name: Troponin (emerg Dept Use Only); Complete Time: 15:12                        rn  

08/10                                                                                             

12:06 Order name: Lipase; Complete Time: 15:12                                                rn  

08/10                                                                                             

12:06 Order name: Basic Metabolic Panel; Complete Time: 15:12                                 EDMS

08/10                                                                                             

12:50 Order name: CBC Smear Scan; Complete Time: 13:19                                        EDMS

08/10                                                                                             

18:27 Order name: COVID-19 : Document "Date of Symptom Onset" if Symptomatic.                 hb  

08/10                                                                                             

19:03 Order name: CORONAVIRUS                                                                 EDMS

08/10                                                                                             

19:54 Order name: SARS-COV-2 RT PCR; Complete Time: 03:47                                     EDMS

08/10                                                                                             

20:10 Order name: Troponin I; Complete Time: 03:47                                            EDMS

08/10                                                                                             

12:06 Order name: XRAY Chest (1 view); Complete Time: 16:31                                   rn  

08/10                                                                                             

12:06 Order name: EKG; Complete Time: 12:07                                                   rn  

08/10                                                                                             

12:06 Order name: CT Chest Abdomen Pelvis W/O Contrast; Complete Time: 13:19                  rn  

08/10                                                                                             

12:33 Order name: Extrem Venous W Compression Saúl US; Complete Time: 15:12                    rn  

08/10                                                                                             

12:33 Order name: Lower Extremity Arterial Bilat US; Complete Time: 16:31                     rn  

08/10                                                                                             

12:54 Order name: US Abdomen Limited; Complete Time: 16:31                                    rn  

08/10                                                                                             

18:22 Order name: CONS Physician Consult                                                      EDMS

                                                                                             

02:52 Order name: CBC with Automated Diff; Complete Time: 03:47                               EDMS

                                                                                             

03:03 Order name: Troponin I; Complete Time: 03:47                                            EDMS

                                                                                             

03:13 Order name: Comprehensive Metabolic Panel; Complete Time: 03:47                         EDMS

                                                                                             

03:13 Order name: Phosphorus; Complete Time: 03:47                                            EDMS

                                                                                             

03:13 Order name: Lipid Profile; Complete Time: 03:47                                         EDMS

                                                                                             

03:14 Order name: T4 Free; Complete Time: 03:47                                               EDMS

                                                                                             

03:14 Order name: Magnesium; Complete Time: 03:47                                             EDMS

                                                                                             

03:14 Order name: Thyroid Stimulating Hormone; Complete Time: 03:47                           EDMS

                                                                                             

03:20 Order name: Hemoglobin A1c; Complete Time: 03:47                                        EDMS

08/10                                                                                             

12:06 Order name: Cardiac monitoring; Complete Time: 12:19                                    rn  

08/10                                                                                             

12:06 Order name: EKG - Nurse/Tech; Complete Time: 12:19                                      rn  

08/10                                                                                             

12:06 Order name: IV Saline Lock; Complete Time: 12:19                                        rn  

08/10                                                                                             

12:06 Order name: Labs collected and sent; Complete Time: 12:20                               rn  

08/10                                                                                             

12:06 Order name: O2 Per Protocol; Complete Time: 12:39                                       rn  

08/10                                                                                             

12:06 Order name: O2 Sat Monitoring; Complete Time: 12:39                                     rn  

                                                                                                  

EC:56 Rate is 66 beats/min. Rhythm is irregularly irregular. QRS Axis is Normal. QRS interval rn  

      is normal. QT interval is normal. No Q waves. T waves are Normal. No ST changes noted.      

      Clinical impression: Atrial Flutter and Variable conduction. Interpreted by me.             

      Reviewed by me.                                                                             

                                                                                                  

Administered Medications:                                                                         

No medications were administered                                                                  

                                                                                                  

                                                                                                  

Disposition Summary:                                                                              

08/10/21 16:47                                                                                    

Hospitalization Ordered                                                                           

      Hospitalization Status: Observation                                                     rn  

      Provider: Boogie Reddy rn  

      Condition: Stable                                                                       rn  

      Problem: new                                                                            rn  

      Symptoms: have improved                                                                 rn  

      Bed/Room Type: Standard                                                                 rn  

      Location: [facility] ER HOLD(08/10/21 18:31)                                                    

      Room Assignment: ERHOLD-(08/10/21 18:31)                                                hb  

      Diagnosis                                                                                   

        - Chest pain, unspecified                                                             rn  

        - Unspecified atrial flutter - With variable conduction                               rn  

        - Acute pulmonary edema                                                               rn  

      Forms:                                                                                      

        - Medication Reconciliation Form                                                      rn  

        - SBAR form                                                                           rn  

Signatures:                                                                                       

Dispatcher [employer]                           Geo Buenrostro MD MD cha Nieto, Roman, MD MD rn Baxter, Heather, RN                     RN                                                      

Ann-Marie Wiley RN                     RN   kg                                                   

                                                                                                  

Corrections: (The following items were deleted from the chart)                                    

17:19 12:14 Home Meds: furosemide 40 mg Oral tab 1 tab once daily; kg                         hb  

18:31 16:47 Telemetry/MedSurg (observation) rn                                                  

18:31 16:47 rn                                                                                hb  

                                                                                                  

**************************************************************************************************

## 2021-08-10 NOTE — RAD REPORT
EXAM DESCRIPTION:  CT - Chest Abd Pelvis Wo Con - 8/10/2021 12:23 pm

 

CLINICAL HISTORY:  Chest and abdomen pain.

sob, abd pain, back pain,

 

COMPARISON:  No comparisons

 

TECHNIQUE:  A limited noncontrast study was performed.

 

All CT scans are performed using dose optimization technique as appropriate and may include automated
 exposure control or mA/KV adjustment according to patient size.

 

FINDINGS:  The lungs are clear.No pleural or pericardial effusion.No intrathoracic adenopathy.

 

The liver, spleen, pancreas, adrenal glands and kidneys are within normal limits. The gallbladder loyd
ears somewhat distended.

 

No bowel obstruction, free air, free fluid or abscess. The appendix is not identified as a discrete s
tructure, however, no secondary findings of appendicitis are identified.   No pathologic lymphadenopa
thy in the abdomen or pelvis.

 

Mild lumbar degenerative changes are present. Bilateral fat containing inguinal hernias, slightly lar
demetrio on the left.

 

IMPRESSION:  Gallbladder distention is evident.Recommend followup gallbladder ultrasound.

 

Elsewhere, no acute process seen.

## 2021-08-10 NOTE — ER
Nurse's Notes                                                                                     

 [facility]                                                                 

Name: Naren Douglas                                                                                  

Age: 61 yrs                                                                                       

Sex: Male                                                                                         

: 1960                                                                                   

MRN: [id number]                                                                                   

Arrival Date: 08/10/2021                                                                          

Time: 11:47                                                                                       

Account#: [id number]                                                                            

Bed 11                                                                                            

Private MD:                                                                                       

Diagnosis: Chest pain, unspecified;Unspecified atrial flutter-With variable conduction;Acute      

  pulmonary edema                                                                                 

                                                                                                  

Presentation:                                                                                     

08/10                                                                                             

12:08 Chief complaint: Patient states: Abdominal pain that radiates to right flank and back,  kg  

      RLE purplish color and constipation starting today. Coronavirus screen: Client denies       

      travel out of the [place] in the last 14 days. At this time, unable to obtain information      

      related to travel outside the [place] At this time, the client does not indicate any           

      symptoms associated with coronavirus-19. Ebola Screen: Patient negative for fever           

      greater than or equal to 101.5 degrees Fahrenheit, and additional compatible Ebola          

      Virus Disease symptoms Patient denies exposure to infectious person. Patient denies         

      travel to an Ebola-affected area in the 21 days before illness onset. Initial Sepsis        

      Screen: Does the patient meet any 2 criteria? No. Patient's initial sepsis screen is        

      negative. Does the patient have a suspected source of infection? No. Patient's initial      

      sepsis screen is negative. Risk Assessment: Do you want to hurt yourself or someone         

      else? Patient reports no desire to harm self or others. Onset of symptoms was August        

      10, 2021.                                                                                   

12:08 Method Of Arrival: Ambulatory                                                           kg  

12:08 Acuity: ELEANOR 3                                                                           kg  

                                                                                                  

Triage Assessment:                                                                                

12:14 General: Appears in no apparent distress. Behavior is calm, cooperative, appropriate    kg  

      for age, quiet. Pain: Complains of pain in Abdomen, R=ight flank, Right back.               

      Cardiovascular: Reports chest pain. GI: Reports lower abdominal pain, upper abdominal       

      pain, constipation.                                                                         

                                                                                                  

Historical:                                                                                       

- Allergies:                                                                                      

12:14 Iodinated Contrast Media - IV Dye;                                                      kg  

12:14 Iodine;                                                                                 kg  

- Home Meds:                                                                                      

12:14 Eliquis 5 mg Oral tab 1 tab 2 times per day [Active]; flecainide 100 mg Oral tab 1 tab  kg  

      every 12 hours [Active]; metoprolol tartrate 50 mg Oral tab 1 tab 2 times per day           

      [Active];                                                                                   

17:19 furosemide 40 mg Oral tab 1 tab 2 times per day [Active]; Klor-Con 10 10 mEq Oral TbER  hb  

      1 tab 2 times per day [Active];                                                             

- PMHx:                                                                                           

12:14 Atrial Fib; Hypertension; Polycythemia;                                                 kg  

                                                                                                  

- Immunization history:: Adult Immunizations up to date, Client reports receiving the             

  1st dose of the Covid vaccine, August 3, 2021 Peter \T\ Peter .                             

- Social history:: Smoking status: Patient reports the use of cigarette tobacco                   

  products, smokes one pack cigarettes per day. Patient uses alcohol, occasionally.               

- Family history:: not pertinent.                                                                 

- Hospitalizations: : No recent hospitalization is reported.                                      

                                                                                                  

                                                                                                  

Screenin:16 Abuse screen: Denies threats or abuse. Denies injuries from another. Nutritional        kg  

      screening: No deficits noted. Tuberculosis screening: No symptoms or risk factors           

      identified. Fall Risk None identified.                                                      

                                                                                                  

Assessment:                                                                                       

12:20 Reassessment: Pt to radiology via stretcher.                                            hb  

15:42 Reassessment: Pt returned from US. NAD. VSS. Awaiting radiology results at this time.   hb  

15:43 General: Appears in no apparent distress. Behavior is calm, cooperative. Pain: Pain     hb  

      currently is 5 out of 10 on a pain scale. Neuro: Level of Consciousness is awake,           

      alert, obeys commands, Oriented to person, place, time, situation. Cardiovascular:          

      Patient's skin is warm and dry. Respiratory: Respiratory effort is even, unlabored,         

      Respiratory pattern is regular, symmetrical. GI: No signs and/or symptoms were reported     

      involving the gastrointestinal system. : No signs and/or symptoms were reported           

      regarding the genitourinary system. EENT: No signs and/or symptoms were reported            

      regarding the EENT system. Derm: Skin is pink, warm \T\ dry. bilateral lower leg redness    

      noted, right leg with minimal swelling.                                                     

17:19 Reassessment: Admission ordered, awaiting hospitalist at this time. NAD. VSS.           hb  

18:26 Reassessment: Patient appears in no apparent distress at this time. No changes from     hb  

      previously documented assessment. Patient and/or family updated on plan of care and         

      expected duration. Pain level reassessed.                                                   

                                                                                                  

Vital Signs:                                                                                      

12:08  / 96; Pulse 72; Resp 20; Temp 96.7(TE); Pulse Ox 100% on R/A; Weight 131.54 kg   kg  

      (R); Height 6 ft. 2 in. (187.96 cm); Pain 8/10;                                             

15:35  / 97; Pulse 53; Resp 18; Pulse Ox 98% ;                                          hb  

16:19  / 82; Pulse 55; Resp 17; Pulse Ox 98% ;                                          hb  

17:20  / 93; Pulse 56; Resp 17; Pulse Ox 98% ;                                          hb  

18:26  / 81; Pulse 67; Resp 17; Pulse Ox 100% ;                                         hb  

12:08 Body Mass Index 37.23 (131.54 kg, 187.96 cm)                                            kg  

                                                                                                  

ED Course:                                                                                        

11:47 Patient arrived in ED.                                                                  mr  

11:55 Linden Liu MD is Attending Physician.                                                rn  

12:14 Triage completed.                                                                       kg  

12:15 Inserted saline lock: 20 gauge in right antecubital area, using aseptic technique.      kj1 

      Blood collected.                                                                            

12:15 Initial lab(s) drawn, by me, sent to lab. EKG done.                                     kj1 

12:16 Patient has correct armband on for positive identification. Cardiac monitor on. Pulse   kg  

      ox on. NIBP on.                                                                             

12:16 No provider procedures requiring assistance completed. Patient maintains SpO2           kg  

      saturation greater than 95% on room air.                                                    

12:22 CT Chest Abdomen Pelvis W/O Contrast In Process Unspecified.                            EDMS

14:42 Extrem Venous W Compression Saúl US In Process Unspecified.                              EDMS

15:03 Note: pt still not in room. second attempt.                                             mh1 

15:40 Lower Extremity Arterial Bilat US In Process Unspecified.                               EDMS

15:41 US Abdomen Limited In Process Unspecified.                                              EDMS

15:41 Concepcion Trevino, RN is Primary Nurse.                                                   hb  

15:42 Door closed. Lights dimmed. Pillow given.                                               hb  

16:19 XRAY Chest (1 view) In Process Unspecified.                                             EDMS

16:45 Boogie Reddy is Hospitalizing Provider.                                               rn  

17:30 Arm band placed on.                                                                     hb  

18:31 Patient admitted, IV remains in place.                                                  hb  

22:00 Diet: sandwich, chips, and gatorade provided.                                           bb  

                                                                                                  

Administered Medications:                                                                         

No medications were administered                                                                  

                                                                                                  

                                                                                                  

Outcome:                                                                                          

16:47 Decision to Hospitalize by Provider.                                                    rn  

18:30 Admitted to ER Hold.  Please see [facility] for further documentation.                    hb  

18:30 Condition: stable                                                                           

18:30 Instructed on the need for admit, Demonstrated understanding of instructions.               

                                                                                             

10:33 Patient left the ED.                                                                    aa5 

                                                                                                  

Signatures:                                                                                       

Dispatcher [employer]                           [facility]                                                 

Bety Fish                                 Heather Asencio                               1                                                  

Ai West, RN                     RN   bb                                                   

Linden Liu MD MD rn Calderon, Audri, RN RN aa5                                                  

Concecpion Trevino RN RN                                                      

Samantha Lloyd                              kj1                                                  

Ann-Marie Wiley RN RN   kg                                                   

                                                                                                  

Corrections: (The following items were deleted from the chart)                                    

08/10                                                                                             

12:19 12:18 Initial lab(s) drawn, by me, sent to lab. EKG done, kj1                           kj1 

17:19 12:14 Home Meds: furosemide 40 mg Oral tab 1 tab once daily; kg                         hb  

                                                                                                  

**************************************************************************************************

## 2021-08-10 NOTE — P.HP
Certification for Inpatient


Patient admitted to: Observation


With expected LOS: <2 Midnights


Patient will require the following post-hospital care: None


Practitioner: I am a practitioner with admitting privileges, knowledge of 

patient current condition, hospital course, and medical plan of care.


Services: Services provided to patient in accordance with Admission requirements

found in Title 42 Section 412.3 of the Code of Federal Regulations





Patient History


Date of Service: 08/10/21


Primary Care Provider: SALLIE


Reason for admission: chest pain


History of Present Illness: 


Mr. Douglas is a 60 yo M with CHF, atrial flutter, HTN, polychtemia, CAD s/p stent 

placement, ARMOND on CPAP who presents with epigastric pain. This morning he woke 

up at 6:30AM with stomach pain, back pain, SOB, and diaphoresis. He also says 

that his legs started to turn purple and he had tingling in his fingers. Five 

hours later, he had no improvement of symptoms even after taking his medications

and some tylenol. He says he also felt lightheaded, dizzy, and had palpitations.

No improvement or worsening of symptoms during that time period. He says this h

as never happened before. At bedside, he is now asymptomatic. Initial EKG showed

atrial flutter, rate controlled. CXR demonstrated CHF. CT Abdomen showed 

gallbladder distension, however abdominal ultrasound unremarkable. Venous 

ultrasound without evidence of DVT. Arterial ultrasound without evidence of PVD.

BUN 20, Cr 1.45, GFR 49. BNP 1533.


Allergies





iodine Allergy (Severe, Verified 10/26/20 20:29)


   Anaphylaxis


codeine Allergy (Intermediate, Verified 10/26/20 20:29)


   Itching





Home Medications: 








Apixaban [Eliquis *] 5 mg PO BID 10/27/20 


Flecainide [Tambocor*] 100 mg PO Q12HR 10/27/20 


Furosemide [Lasix*] 40 mg PO BID 20 


Metoprolol Tartrate 1 tab PO BID 20 


Potassium Chloride [Klor-Con 10] 10 meq PO BID 08/10/21 








- Past Medical/Surgical History


Diabetic: No


-: HTN


-: Hyperlipidemia


-: Atrial fibrillation, on chronic anti coagulation therapy


-: CAD, Previous stent


-: Obesity


-: Obstructive sleep apnea


-: Tobacco abuse


-: NIDDM


-: Cardiac stent 


-: Bilateral knee arthroscopic surgery


-: Appendectomy


-: Hernia repair


-: hernia repair


Psychosocial/ Personal History: patient is 





- Family History


  ** Father


-: Cancer


Notes: , colon rectal ca





  ** Mother


-: Heart disease, Hypertension


Notes: 





  ** Brother


-: Diabetes





- Social History


Smoking Status: Current every day smoker


Alcohol use: No


CD- Drugs: No


Caffeine use: No


Place of Residence: Home





Review of Systems


10-point ROS is otherwise unremarkable


General: Sweats


Respiratory: Shortness of Breath


Cardiovascular: Palpitations, Light Headedness


Gastrointestinal: Abdominal Pain


Musculoskeletal: Back Pain


Neurological: Numbness





Physical Examination





- Physical Exam


General: Alert, In no apparent distress


HEENT: Atraumatic, PERRLA, Mucous membr. moist/pink, EOMI, Sclerae nonicteric


Neck: Supple, 2+ carotid pulse no bruit, No LAD, Without JVD or thyroid 

abnormality


Respiratory: Normal air movement, Crackles/rales


Cardiovascular: No edema, No gallops, No rubs, Irregular heart rate/rhythm, 

Abnormal S1 S2


Gastrointestinal: Normal bowel sounds, No tenderness


Musculoskeletal: No tenderness


Integumentary: No rashes, Other (discoloration in ankles, right greater than 

left)


Neurological: Normal gait, Normal speech, Normal strength at 5/5 x4 extr, Normal

tone, Normal affect


Lymphatics: No axilla or inguinal lymphadenopathy





- Studies


Laboratory Data (last 24 hrs)





08/10/21 12:15: PT 15.4 H, INR 1.34


08/10/21 12:15: WBC 8.00, Hgb 14.5, Hct 46.4, Plt Count 173


08/10/21 12:15: Sodium 140, Potassium 4.1, BUN 20 H, Creatinine 1.45 H, Glucose 

115 H, Magnesium 2.1, Total Bilirubin 0.5, AST 26, ALT 38, Alkaline Phosphatase 

64, Lipase 160








Assessment and Plan





- Problems (Diagnosis)


(1) ARMOND (obstructive sleep apnea)


Current Visit: Yes   Status: Chronic   





(2) CAD (coronary artery disease)


Current Visit: Yes   Status: Chronic   


Qualifiers: 


   Coronary Disease-Associated Artery/Lesion type: native artery   Native vs. 

transplanted heart: native heart   Associated angina: without angina   Qualified

Code(s): I25.10 - Atherosclerotic heart disease of native coronary artery 

without angina pectoris   





(3) HTN (hypertension)


Current Visit: Yes   Status: Chronic   


Qualifiers: 


   Hypertension type: primary hypertension   Qualified Code(s): I10 - Essential 

(primary) hypertension   





(4) Atrial flutter


Current Visit: Yes   Status: Chronic   


Qualifiers: 


   Atrial flutter type: unspecified   Qualified Code(s): I48.92 - Unspecified 

atrial flutter   





(5) Malignant diastolic hypertension with CHF, NYHA class 3


Current Visit: No   Status: Chronic   





(6) Polycythemia


Current Visit: No   Status: Chronic   





- Plan


cardiology consulted


on tele, repeat EKG, trend troponins


continue home medications - flecainide, metoprolol, eliquis


daily ASA, statin, morphine + NTG prn 


IV lasix, daily weights, fluid restrict, low sodium diet


lipid and thyroid panel pending 


dietitian consulted, social work consulted 


Discharge Plan: Home


Plan to discharge in: 24 Hours





- Advance Directives


Does patient have a Living Will: No


Does patient have a Durable POA for Healthcare: No





- Code Status/Comfort Care


Code Status Assessed: Yes (full code )


Critical Care: No


Time Spent Managing Pts Care (In Minutes): 70

## 2021-08-10 NOTE — RAD REPORT
EXAM DESCRIPTION:  US - Abdomen Exam Limited - 8/10/2021 3:41 pm

 

CLINICAL HISTORY:  gallbaladder distension on ct

Abdominal pain

 

COMPARISON:  No comparisons

 

FINDINGS:  The gallbladder demonstrates no gallstones. No pericholecystic fluid or gallbladder wall t
hickening. The common bile duct is normal measuring 4 mm.

 

The liver demonstrates no findings of intrahepatic biliary dilatation.

 

IMPRESSION:  Unremarkable examination.

## 2021-08-10 NOTE — RAD REPORT
EXAM DESCRIPTION:  RAD - Chest Single View - 8/10/2021 4:20 pm

 

CLINICAL HISTORY:  DYSPNEA

Chest pain.

 

COMPARISON:  Chest Single View dated 12/16/2020; Chest Single View dated 12/14/2020; Chest Pa And Lat
 (2 Views) dated 10/27/2020; Chest Single View dated 10/26/2020

 

FINDINGS:  Portable technique limits examination quality.

 

The interstitial markings are mildly prominent bilaterally. The heart is moderately enlarged in size.
 No displaced fractures.

 

IMPRESSION:  The findings may indicate CHF.

## 2021-08-11 VITALS — SYSTOLIC BLOOD PRESSURE: 148 MMHG | DIASTOLIC BLOOD PRESSURE: 98 MMHG

## 2021-08-11 LAB
ALBUMIN SERPL BCP-MCNC: 3.6 G/DL (ref 3.4–5)
ALP SERPL-CCNC: 55 U/L (ref 45–117)
ALT SERPL W P-5'-P-CCNC: 35 U/L (ref 12–78)
AST SERPL W P-5'-P-CCNC: 23 U/L (ref 15–37)
BUN BLD-MCNC: 25 MG/DL (ref 7–18)
GLUCOSE SERPLBLD-MCNC: 96 MG/DL (ref 74–106)
HCT VFR BLD CALC: 45.1 % (ref 39.6–49)
HDLC SERPL-MCNC: 20 MG/DL (ref 40–60)
LDLC SERPL CALC-MCNC: 43 MG/DL (ref ?–130)
LYMPHOCYTES # SPEC AUTO: 1.7 K/UL (ref 0.7–4.9)
MAGNESIUM SERPL-MCNC: 2.3 MG/DL (ref 1.8–2.4)
PMV BLD: 8.2 FL (ref 7.6–11.3)
POTASSIUM SERPL-SCNC: 4.2 MMOL/L (ref 3.5–5.1)
RBC # BLD: 5.93 M/UL (ref 4.33–5.43)
TSH SERPL DL<=0.05 MIU/L-ACNC: 6.01 UIU/ML (ref 0.36–3.74)

## 2021-08-11 NOTE — EKG
Test Date:    2021-08-10               Test Time:    12:01:40

Technician:   JOHN                                     

                                                     

MEASUREMENT RESULTS:                                       

Intervals:                                           

Rate:         66                                     

ID:                                                  

QRSD:         130                                    

QT:           466                                    

QTc:          488                                    

Axis:                                                

P:                                                   

ID:                                                  

QRS:          72                                     

T:            47                                     

                                                     

INTERPRETIVE STATEMENTS:                                       

                                                     

Atrial flutter with variable AV block with premature ventricular or

aberrantly conducted complexes

Nonspecific intraventricular block

Abnormal ECG

Compared to ECG 12/14/2020 07:15:48

Ventricular premature complex(es) now present

Atrial fibrillation no longer present

Myocardial infarct finding no longer present



Electronically Signed On 08-11-21 12:59:28 CDT by Kamlesh Driscoll

## 2021-08-11 NOTE — CON
Date of Consultation:  08/11/2021



Reason For Consultation:  Chest pain.



History Of Present Illness:  Mr. Douglas is 61.  He is a patient of mine from the past.  Has a history o
f atrial fibrillation, hypertension, polycythemia vera.  He takes Eliquis, flecainide, metoprolol, La
six and potassium.  He has done well from an atrial fibrillation standpoint.  Has occasional atrial f
lutter, rate controlled.  He came in with a creatinine of 1.54.  His BNP was elevated.  Had mid epiga
stric pain radiating to the back.  No nausea, vomiting, diaphoresis, PND, orthopnea, pedal edema, pal
pitations, or syncope.  He has already ruled out for an MI.  Last echo in October 2020 was normal.



Past Medical History:  As stated above.



Allergies:  HE IS ALLERGIC TO IODINE AND CODEINE.



Review of Systems:

Negative.



Social History:  Negative.



Family History:  Negative.



Medications:  Listed earlier.



Physical Examination:

Vital Signs:  Stable.  Atrial fibrillation, flutter at 63. 

HEENT:  Negative. 

Neck:  Supple with no bruit. 

Chest:  Clear. 

Cardiac:  Revealed atrial fibrillation. 

Abdomen:  Obese, but benign. 

Extremities:  Revealed no clubbing, cyanosis, or edema.



Diagnostic Data:  As stated earlier.



Impression And Plan:  

1.Chronic atrial fibrillation and flutter.  He is on Eliquis, flecainide, metoprolol.  No change in 
therapy.

2.Hypertension.

3.Polycythemia vera.

4.Atypical chest pain.  The patient can go home today.  We will make arrangements for him to have an
 outpatient stress test in the near future.  I think his symptoms are more gastric in nature.





NB/MODL

DD:  08/11/2021 12:13:11Voice ID:  [id number]

DT:  08/11/2021 13:46:52Report ID:  [id number]

## 2021-12-06 ENCOUNTER — HOSPITAL ENCOUNTER (INPATIENT)
Dept: HOSPITAL 97 - ER | Age: 61
LOS: 4 days | Discharge: HOME | DRG: 291 | End: 2021-12-10
Admitting: HOSPITALIST
Payer: COMMERCIAL

## 2021-12-06 VITALS — BODY MASS INDEX: 35.8 KG/M2

## 2021-12-06 DIAGNOSIS — N18.30: ICD-10-CM

## 2021-12-06 DIAGNOSIS — I48.92: ICD-10-CM

## 2021-12-06 DIAGNOSIS — E87.6: ICD-10-CM

## 2021-12-06 DIAGNOSIS — Z79.899: ICD-10-CM

## 2021-12-06 DIAGNOSIS — I13.0: Primary | ICD-10-CM

## 2021-12-06 DIAGNOSIS — Z86.16: ICD-10-CM

## 2021-12-06 DIAGNOSIS — Z79.01: ICD-10-CM

## 2021-12-06 DIAGNOSIS — N17.9: ICD-10-CM

## 2021-12-06 DIAGNOSIS — I50.33: ICD-10-CM

## 2021-12-06 DIAGNOSIS — E66.9: ICD-10-CM

## 2021-12-06 DIAGNOSIS — Z95.5: ICD-10-CM

## 2021-12-06 DIAGNOSIS — I48.0: ICD-10-CM

## 2021-12-06 DIAGNOSIS — J96.01: ICD-10-CM

## 2021-12-06 DIAGNOSIS — I07.1: ICD-10-CM

## 2021-12-06 DIAGNOSIS — Z20.822: ICD-10-CM

## 2021-12-06 DIAGNOSIS — E78.5: ICD-10-CM

## 2021-12-06 DIAGNOSIS — I25.10: ICD-10-CM

## 2021-12-06 DIAGNOSIS — G47.33: ICD-10-CM

## 2021-12-06 DIAGNOSIS — R94.5: ICD-10-CM

## 2021-12-06 DIAGNOSIS — I27.20: ICD-10-CM

## 2021-12-06 DIAGNOSIS — Z88.5: ICD-10-CM

## 2021-12-06 DIAGNOSIS — Z91.041: ICD-10-CM

## 2021-12-06 LAB
ALBUMIN SERPL BCP-MCNC: 3.7 G/DL (ref 3.4–5)
ALP SERPL-CCNC: 62 U/L (ref 45–117)
ALT SERPL W P-5'-P-CCNC: 46 U/L (ref 12–78)
ANISOCYTOSIS BLD QL: (no result)
AST SERPL W P-5'-P-CCNC: 24 U/L (ref 15–37)
BLD SMEAR INTERP: (no result)
BUN BLD-MCNC: 27 MG/DL (ref 7–18)
GLUCOSE SERPLBLD-MCNC: 118 MG/DL (ref 74–106)
HCT VFR BLD CALC: 44.9 % (ref 39.6–49)
HYPOCHROMIA BLD QL: (no result)
INR BLD: 1.62
LYMPHOCYTES # SPEC AUTO: 1.3 K/UL (ref 0.7–4.9)
MAGNESIUM SERPL-MCNC: 2.3 MG/DL (ref 1.8–2.4)
MORPHOLOGY BLD-IMP: (no result)
NT-PROBNP SERPL-MCNC: 2955 PG/ML (ref ?–125)
PMV BLD: 8.6 FL (ref 7.6–11.3)
POIKILOCYTOSIS BLD QL SMEAR: SLIGHT
POLYCHROMASIA BLD QL SMEAR: SLIGHT
POTASSIUM SERPL-SCNC: 4.2 MMOL/L (ref 3.5–5.1)
RBC # BLD: 6.09 M/UL (ref 4.33–5.43)
TROPONIN (EMERG DEPT USE ONLY): 0.03 NG/ML (ref 0–0.04)
TSH SERPL DL<=0.05 MIU/L-ACNC: 6.6 UIU/ML (ref 0.36–3.74)

## 2021-12-06 PROCEDURE — 84100 ASSAY OF PHOSPHORUS: CPT

## 2021-12-06 PROCEDURE — 76770 US EXAM ABDO BACK WALL COMP: CPT

## 2021-12-06 PROCEDURE — 84439 ASSAY OF FREE THYROXINE: CPT

## 2021-12-06 PROCEDURE — 85025 COMPLETE CBC W/AUTO DIFF WBC: CPT

## 2021-12-06 PROCEDURE — 93005 ELECTROCARDIOGRAM TRACING: CPT

## 2021-12-06 PROCEDURE — 84156 ASSAY OF PROTEIN URINE: CPT

## 2021-12-06 PROCEDURE — 94660 CPAP INITIATION&MGMT: CPT

## 2021-12-06 PROCEDURE — 93017 CV STRESS TEST TRACING ONLY: CPT

## 2021-12-06 PROCEDURE — 82652 VIT D 1 25-DIHYDROXY: CPT

## 2021-12-06 PROCEDURE — 84550 ASSAY OF BLOOD/URIC ACID: CPT

## 2021-12-06 PROCEDURE — 84443 ASSAY THYROID STIM HORMONE: CPT

## 2021-12-06 PROCEDURE — 84300 ASSAY OF URINE SODIUM: CPT

## 2021-12-06 PROCEDURE — 83880 ASSAY OF NATRIURETIC PEPTIDE: CPT

## 2021-12-06 PROCEDURE — 84132 ASSAY OF SERUM POTASSIUM: CPT

## 2021-12-06 PROCEDURE — 83935 ASSAY OF URINE OSMOLALITY: CPT

## 2021-12-06 PROCEDURE — 81003 URINALYSIS AUTO W/O SCOPE: CPT

## 2021-12-06 PROCEDURE — 96374 THER/PROPH/DIAG INJ IV PUSH: CPT

## 2021-12-06 PROCEDURE — 94760 N-INVAS EAR/PLS OXIMETRY 1: CPT

## 2021-12-06 PROCEDURE — 80048 BASIC METABOLIC PNL TOTAL CA: CPT

## 2021-12-06 PROCEDURE — 94640 AIRWAY INHALATION TREATMENT: CPT

## 2021-12-06 PROCEDURE — 81015 MICROSCOPIC EXAM OF URINE: CPT

## 2021-12-06 PROCEDURE — 71045 X-RAY EXAM CHEST 1 VIEW: CPT

## 2021-12-06 PROCEDURE — 80076 HEPATIC FUNCTION PANEL: CPT

## 2021-12-06 PROCEDURE — 83970 ASSAY OF PARATHORMONE: CPT

## 2021-12-06 PROCEDURE — 82947 ASSAY GLUCOSE BLOOD QUANT: CPT

## 2021-12-06 PROCEDURE — 93306 TTE W/DOPPLER COMPLETE: CPT

## 2021-12-06 PROCEDURE — 82140 ASSAY OF AMMONIA: CPT

## 2021-12-06 PROCEDURE — 80061 LIPID PANEL: CPT

## 2021-12-06 PROCEDURE — 78452 HT MUSCLE IMAGE SPECT MULT: CPT

## 2021-12-06 PROCEDURE — 85730 THROMBOPLASTIN TIME PARTIAL: CPT

## 2021-12-06 PROCEDURE — 84484 ASSAY OF TROPONIN QUANT: CPT

## 2021-12-06 PROCEDURE — 82805 BLOOD GASES W/O2 SATURATION: CPT

## 2021-12-06 PROCEDURE — 83735 ASSAY OF MAGNESIUM: CPT

## 2021-12-06 PROCEDURE — 76705 ECHO EXAM OF ABDOMEN: CPT

## 2021-12-06 PROCEDURE — 82306 VITAMIN D 25 HYDROXY: CPT

## 2021-12-06 PROCEDURE — 36415 COLL VENOUS BLD VENIPUNCTURE: CPT

## 2021-12-06 PROCEDURE — 85610 PROTHROMBIN TIME: CPT

## 2021-12-06 PROCEDURE — 85379 FIBRIN DEGRADATION QUANT: CPT

## 2021-12-06 PROCEDURE — 5A09457 ASSISTANCE WITH RESPIRATORY VENTILATION, 24-96 CONSECUTIVE HOURS, CONTINUOUS POSITIVE AIRWAY PRESSURE: ICD-10-PCS

## 2021-12-06 PROCEDURE — 99285 EMERGENCY DEPT VISIT HI MDM: CPT

## 2021-12-06 PROCEDURE — 82570 ASSAY OF URINE CREATININE: CPT

## 2021-12-06 PROCEDURE — 80074 ACUTE HEPATITIS PANEL: CPT

## 2021-12-06 PROCEDURE — 82977 ASSAY OF GGT: CPT

## 2021-12-06 PROCEDURE — 86038 ANTINUCLEAR ANTIBODIES: CPT

## 2021-12-06 PROCEDURE — 80053 COMPREHEN METABOLIC PANEL: CPT

## 2021-12-06 RX ADMIN — APIXABAN SCH MG: 5 TABLET, FILM COATED ORAL at 20:15

## 2021-12-06 RX ADMIN — FLECAINIDE ACETATE SCH MG: 100 TABLET ORAL at 20:15

## 2021-12-06 RX ADMIN — ALBUMIN (HUMAN) SCH MG: 5 SOLUTION INTRAVENOUS at 18:28

## 2021-12-06 RX ADMIN — Medication PRN MG: at 20:15

## 2021-12-06 RX ADMIN — ACETAMINOPHEN PRN MG: 500 TABLET, FILM COATED ORAL at 14:09

## 2021-12-06 RX ADMIN — Medication SCH ML: at 20:16

## 2021-12-06 RX ADMIN — METOPROLOL TARTRATE SCH MG: 50 TABLET, FILM COATED ORAL at 20:15

## 2021-12-06 NOTE — CON
Date of Consultation:  12/06/2021



Reason For Consultation:  Heart failure.



History Of Present Illness:  This is a 61-year-old male with a history of atrial fibrillation, corona
ry artery disease, obstructive sleep apnea, presented with worsening shortness of breath and orthopne
a.  Shortness of breath woke him come up from sleep along with nausea and denies having any chest michelle
n and no other complaints.



Past Medical History:  As outlined above in HPI.



Medications:  Reviewed.  Refer to reconciliation sheet for detailed list.



Allergies:  IODINE AND CODEINE.



Family History:  No mention of coronary artery disease.  There is a history of rectal cancer in fathe
r's side.



Social History:  Does not smoke or drink.  Does not use any drugs.



Review of Systems:

All systems reviewed and they were negative except for mentioned in the HPI.



Physical Examination:

Vital Signs:  Temperature is 97.4, pulse 64, breathing at 17, blood pressure 117/92, saturating 100%.
 

General:  Pleasant, middle-aged male, in no distress. 

Head and Neck:  Pupils are equal, reactive to light.  No JVD.  No cervical lymphadenopathy.  Neck is 
supple.  Thyroid is not enlarged. 

Lungs:  Crackles in both bases.  No accessory muscle use.  No muscle retraction. 

Heart:  Irregularly irregular.  No extra sounds. 

Abdomen:  Soft, nontender.  Bowel sounds positive.  No organomegaly. No masses or hernia.  No rigidit
y or rebound. 

Extremities:  No clubbing or cyanosis, positive for edema. 

Skin:  No rash.  No nodules. 

Neurologic:  Alert, awake, and oriented x3.  No acute focal deficits appreciated.



Investigations:  Creatinine 1.68, baseline is 1.54 from August.  NT-proBNP is 2955.  First troponin 0
.03.



Assessment And Recommendations:  

1.Acute on chronic congestive heart failure exacerbation.  Start on Lasix 60 mg IV q.12 hours.  Care
fully monitor BUN, creatinine, and electrolytes, and obtain echocardiogram.  Please trend 2 more sets
 of cardiac enzymes.

2.Known history of coronary artery disease, mild as per cath done in 2013.  However, the patient had
 this episode woke him up from sleep with diaphoresis and nausea.  Serial cardiac enzymes recommended
 to rule out myocardial injury and obtain echocardiogram and further plan accordingly.





SR/MODL

DD:  12/06/2021 15:50:34Voice ID:  [id number]

DT:  12/06/2021 19:06:27Report ID:  [id number]

## 2021-12-06 NOTE — EDPHYS
Physician Documentation                                                                           

 [facility]                                                                 

Name: Naren Douglas                                                                                  

Age: 61 yrs                                                                                       

Sex: Male                                                                                         

: 1960                                                                                   

MRN: [id number]                                                                                   

Arrival Date: 2021                                                                          

Time: 09:18                                                                                       

Account#: [id number]                                                                            

Bed 16                                                                                            

Private MD:                                                                                       

ED Physician Geo Johnson                                                                      

HPI:                                                                                              

                                                                                             

10:38 This 61 yrs old  Male presents to ER via Wheelchair with complaints of         pam 

      Shortness Of Breath, Dizziness.                                                             

10:39 The patient has shortness of breath with light activity, that woke him/her from sleep.  pam 

      Onset: The symptoms/episode began/occurred this morning. Duration: The symptoms are         

      continuous, and are steadily getting worse. The patient's shortness of breath is            

      aggravated by exertion, light activity, supine position. Associated signs and symptoms:     

      Pertinent positives: non-productive cough. Severity of symptoms: At their worst the         

      symptoms were moderate in the emergency department the symptoms are unchanged. The          

      patient has not experienced similar symptoms in the past.                                   

                                                                                                  

Historical:                                                                                       

- Allergies:                                                                                      

09:22 Iodinated Contrast Media - IV Dye;                                                      iw  

09:53 Iodine;                                                                                 tw2 

09:53 Codeine;                                                                                tw2 

- Home Meds:                                                                                      

09:22 Eliquis 5 mg Oral tab 1 tab 2 times per day [Active]; flecainide 100 mg Oral tab 1 tab  iw  

      every 12 hours [Active]; Klor-Con 10 10 mEq Oral TbER 1 tab 2 times per day [Active];       

      metoprolol tartrate 50 mg Oral tab 1 tab 2 times per day [Active]; Bumetanide Oral 2        

      times per day [Active];                                                                     

- PMHx:                                                                                           

09:22 Atrial Fib; Hypertension; Polycythemia;                                                 iw  

09:53 Sleep apnea;                                                                            tw2 

09:53 CPAP;                                                                                   tw2 

- PSHx:                                                                                           

09:22 None;                                                                                   iw  

                                                                                                  

- Immunization history:: Client reports receiving the Peter \T\ Peter single-dose             

  vaccine.                                                                                        

- Social history:: Smoking status: Patient/guardian denies using tobacco, Stopped _               

  months ago 1.                                                                                   

                                                                                                  

                                                                                                  

ROS:                                                                                              

10:42 Constitutional: Negative for fever, chills, and weight loss, Eyes: Negative for injury, pam 

      pain, redness, and discharge, ENT: Negative for injury, pain, and discharge, Neck:          

      Negative for injury, pain, and swelling, Abdomen/GI: Negative for abdominal pain,           

      nausea, vomiting, diarrhea, and constipation, Back: Negative for injury and pain, :       

      Negative for injury, bleeding, discharge, and swelling, MS/Extremity: Negative for          

      injury and deformity, Skin: Negative for injury, rash, and discoloration, Neuro:            

      Negative for headache, weakness, numbness, tingling, and seizure, Psych: Negative for       

      depression, anxiety, suicide ideation, homicidal ideation, and hallucinations,              

      Allergy/Immunology: Negative for hives, rash, and allergies, Endocrine: Negative for        

      neck swelling, polydipsia, polyuria, polyphagia, and marked weight changes,                 

      Hematologic/Lymphatic: Negative for swollen nodes, abnormal bleeding, and unusual           

      bruising.                                                                                   

10:42 Cardiovascular: Positive for chest pain, of the chest, orthopnea, palpitations,             

      paroxysmal nocturnal dyspnea.                                                               

                                                                                                  

Exam:                                                                                             

10:42 Constitutional:  This is a well developed, well nourished patient who is awake, alert,  pam 

      and in no acute distress. Head/Face:  Normocephalic, atraumatic. Eyes:  Pupils equal        

      round and reactive to light, extra-ocular motions intact.  Lids and lashes normal.          

      Conjunctiva and sclera are non-icteric and not injected.  Cornea within normal limits.      

      Periorbital areas with no swelling, redness, or edema. ENT:  Nares patent. No nasal         

      discharge, no septal abnormalities noted.  Tympanic membranes are normal and external       

      auditory canals are clear.  Oropharynx with no redness, swelling, or masses, exudates,      

      or evidence of obstruction, uvula midline.  Mucous membranes moist. Neck:  Trachea          

      midline, no thyromegaly or masses palpated, and no cervical lymphadenopathy.  Supple,       

      full range of motion without nuchal rigidity, or vertebral point tenderness.  No            

      Meningismus. Chest/axilla:  Normal chest wall appearance and motion.  Nontender with no     

      deformity.  No lesions are appreciated. Abdomen/GI:  Soft, non-tender, with normal          

      bowel sounds.  No distension or tympany.  No guarding or rebound.  No evidence of           

      tenderness throughout. Back:  No spinal tenderness.  No costovertebral tenderness.          

      Full range of motion. Male :  Normal genitalia with no discharge or lesions. Skin:        

      Warm, dry with normal turgor.  Normal color with no rashes, no lesions, and no evidence     

      of cellulitis. MS/ Extremity:  Pulses equal, no cyanosis.  Neurovascular intact.  Full,     

      normal range of motion. Neuro:  Awake and alert, GCS 15, oriented to person, place,         

      time, and situation.  Cranial nerves II-XII grossly intact.  Motor strength 5/5 in all      

      extremities.  Sensory grossly intact.  Cerebellar exam normal.  Normal gait. Psych:         

      Awake, alert, with orientation to person, place and time.  Behavior, mood, and affect       

      are within normal limits.                                                                   

10:42 Cardiovascular: Rate: normal, Rhythm: irregularly irregular, Pulses: Pulses are 4+ in       

      bilateral radial, brachial, femoral, popliteal, posterior tibial and and dorsalis pedis     

      arteries.. Heart sounds: normal, normal S1and S2, no S3 or S4, no murmur, no rub, no        

      gallop, Edema: is not appreciated, JVD: is noted bilaterally, to 3 cm.                      

10:42 ECG was reviewed by the Attending Physician.                                                

                                                                                                  

Vital Signs:                                                                                      

09:20  / 84; Pulse 72; Resp 18; Temp 97.0; Pulse Ox 99% on R/A; Weight 126.55 kg;       iw  

      Height 6 ft. 2 in. (187.96 cm);                                                             

10:30 BP 99 / 59 Supine; Pulse 77; Resp 14; Pulse Ox 100% on 2 lpm NC;                        tw2 

11:27  / 84; Pulse 77; Resp 17; Pulse Ox 100% on 10 lpm NC;                             tw2 

12:30  / 92; Pulse 64; Resp 17; Pulse Ox 100% on 8 lpm NC;                              tw2 

09:20 Body Mass Index 35.82 (126.55 kg, 187.96 cm)                                            iw  

                                                                                                  

MDM:                                                                                              

09:34 Patient medically screened.                                                             [facility] 

10:45 Data reviewed: vital signs, nurses notes, lab test result(s), EKG, radiologic studies,  [facility] 

      CT scan, plain films.                                                                       

                                                                                                  

                                                                                             

09:29 Order name: Basic Metabolic Panel; Complete Time: 10:32                                 iw  

                                                                                             

09:29 Order name: CBC with Diff; Complete Time: 13:17                                         iw  

                                                                                             

09:29 Order name: LFT's; Complete Time: 10:32                                                 iw  

                                                                                             

09:29 Order name: Magnesium; Complete Time: 10:32                                             iw  

                                                                                             

09:29 Order name: NT PRO-BNP; Complete Time: 10:32                                            iw  

                                                                                             

09:29 Order name: PT-INR; Complete Time: 10:32                                                iw  

                                                                                             

09:29 Order name: Troponin (emerg Dept Use Only); Complete Time: 10:32                        iw  

                                                                                             

09:29 Order name: XRAY Chest (1 view); Complete Time: 10:32                                   iw  

                                                                                             

09:35 Order name: TSH; Complete Time: 10:32                                                   pam 

                                                                                             

10:18 Order name: T4 Free; Complete Time: 10:32                                               EDMS

                                                                                             

10:32 Order name: BIPAP                                                                       pam 

                                                                                             

11:05 Order name: SARS-COV-2 RT PCR (Document "Date of Onset" if Symptomatic); Complete Time: iw  

      13:17                                                                                       

                                                                                             

11:14 Order name: CBC Smear Scan; Complete Time: 13:17                                        EDMS

                                                                                             

09:29 Order name: EKG; Complete Time: 09:30                                                   iw  

                                                                                             

09:29 Order name: Cardiac monitoring; Complete Time: 09:32                                    iw  

                                                                                             

09:29 Order name: EKG - Nurse/Tech; Complete Time: 09:32                                      iw  

                                                                                             

09:29 Order name: IV Saline Lock; Complete Time: 09:41                                        iw  

                                                                                             

09:29 Order name: Labs collected and sent; Complete Time: 09:41                               iw  

                                                                                             

09:29 Order name: O2 Per Protocol; Complete Time: 09:32                                       iw  

                                                                                             

09:29 Order name: O2 Sat Monitoring; Complete Time: 09:32                                     iw  

                                                                                             

13:26 Order name: EKG; Complete Time: 13:27                                                   pam 

                                                                                             

13:26 Order name: EKG - Nurse/Tech                                                            pam 

                                                                                             

13:26 Order name: EKG - Nurse/Tech                                                            bd  

                                                                                             

13:26 Order name: EKG; Complete Time: 13:27                                                   bd  

                                                                                                  

ECG:                                                                                              

10:42 Rate is 75 beats/min. Rhythm is irregularly irregular. QRS Axis is Normal. NE interval  pam 

      is normal. QRS interval is normal. QT interval is normal. No Q waves. T waves are           

      Normal. No ST changes noted. Clinical impression: Abnormal EKG without significant          

      change and Atrial Fibrillation. Interpreted by me. Reviewed by me.                          

                                                                                                  

Administered Medications:                                                                         

10:54 Drug: Lasix (furosemide) 40 mg Route: IVP; Site: left forearm;                          tw2 

11:31 Follow up: Response: No adverse reaction                                                tw2 

                                                                                                  

                                                                                                  

Disposition Summary:                                                                              

21 10:52                                                                                    

Hospitalization Ordered                                                                           

      Hospitalization Status: Inpatient Admission                                             pam 

      Provider: Boogie Reddy cha 

      Location: Telemetry/[facility] (Inpatient)                                                 pam 

      Condition: Fair                                                                         pam 

      Problem: new                                                                            pam 

      Symptoms: have improved                                                                 pam 

      Bed/Room Type: Standard                                                                 pam 

      Room Assignment: 421(21 12:35)                                                    dw  

      Diagnosis                                                                                   

        - Chest pain, unspecified                                                             pam 

        - Systolic (congestive) heart failure                                                 pam 

        - Paroxysmal atrial fibrillation                                                      pam 

        - Obesity, unspecified                                                                pam 

        - Unspecified kidney failure                                                          pam 

      Forms:                                                                                      

        - Medication Reconciliation Form                                                      pam 

        - SBAR form                                                                           pam 

Signatures:                                                                                       

Dispatcher [employer]                           EDVerito Thomas Diana, RN                        RN   Geo Vásquez MD MD cha Williams, Irene, RN RN   iw                                                   

Qian Hernandez RN                          RN   tw2                                                  

                                                                                                  

Corrections: (The following items were deleted from the chart)                                    

09:23 09:22 Home Meds: furosemide 40 mg Oral tab 1 tab 2 times per day;                       

12:35 10:52 pam                                                                               dw  

                                                                                                  

**************************************************************************************************

## 2021-12-06 NOTE — P.HP
Certification for Inpatient


Patient admitted to: Observation


With expected LOS: <2 Midnights


Practitioner: I am a practitioner with admitting privileges, knowledge of 

patient current condition, hospital course, and medical plan of care.


Services: Services provided to patient in accordance with Admission requirements

found in Title 42 Section 412.3 of the Code of Federal Regulations





Patient History


Date of Service: 21


Reason for admission: Shortness of breath


History of Present Illness: 


62-year-old gentleman with a history of atrial fibrillation, coronary artery 

disease and obstructive sleep apnea presented to the emergency department with a

complaint of shortness of breath of sudden onset last night.  Patient reports 

shortness of breath which woke him up from sleep.  Symptoms associated with 

burning sensation in the chest.  He denied any diaphoresis or nausea.  He has 

nonproductive cough.  Chest x-ray done in the emergency department demonstrate 

pulmonary vascular congestion.  EKG showed atrial flutter which is rate 

controlled.  Patient is on chronic anticoagulation with Eliquis.  He was 

admitted in 2021 and spent about a month in the hospital for Covid 

pneumonia.  His serum creatinine is elevated above baseline.  Patient given a 

dose of IV Lasix.  He is admitted for further management.





Allergies





iodine Allergy (Severe, Verified 10/26/20 20:29)


   Anaphylaxis


codeine Allergy (Intermediate, Verified 10/26/20 20:29)


   Itching





Home Medications: 








Apixaban [Eliquis *] 5 mg PO BID 10/27/20 


Flecainide [Tambocor*] 100 mg PO Q12HR 10/27/20 


Metoprolol Tartrate 1 tab PO BID 20 


Apixaban [Eliquis] 5 mg PO BID #60 tablet 21 


Atorvastatin Calcium [Lipitor*] 40 mg PO BEDTIME #60 tab 21 


Flecainide [Tambocor*] 100 mg PO BID #60 tab 21 


Furosemide [Lasix*] 40 mg PO BID #60 tab 21 


Metoprolol Tartrate [Lopressor*] 50 mg PO BID #60 tab 21 


Potassium Chloride [Klor-Con 10] 10 meq PO BID #60 21 








- Past Medical/Surgical History


Diabetic: No


-: HTN


-: Hyperlipidemia


-: Atrial fibrillation, on chronic anti coagulation therapy


-: CAD, Previous stent


-: Obesity


-: Obstructive sleep apnea


-: Tobacco abuse


-: NIDDM


-: Cardiac stent 


-: Bilateral knee arthroscopic surgery


-: Appendectomy


-: Hernia repair


-: hernia repair


Psychosocial/ Personal History: patient is 





- Family History


  ** Father


-: Cancer


Notes: , colon rectal ca





  ** Mother


-: Heart disease, Hypertension


Notes: 





  ** Brother


-: Diabetes





- Social History


Alcohol use: No


CD- Drugs: No


Caffeine use: No





Review of Systems


Other: 


Patient denied any fever, he denied any headache or any change in urinary 

frequency.





Except as documented, all other systems reviewed and negative.








Physical Examination





- Physical Exam


General: Alert, In no apparent distress, Oriented x3


HEENT: Mucous membr. moist/pink


Neck: JVD not distended


Respiratory: Diminished, Crackles/rales (Bibasilar Rales)


Cardiovascular: No edema, Normal S1 S2, No murmurs, Irregular heart rate/rhythm


Capillary refill: <2 Seconds


Gastrointestinal: Normal bowel sounds, Soft and benign, Non-distended, No 

tenderness


Musculoskeletal: No swelling


Integumentary: No rashes, No erythema


Neurological: Normal speech, Normal strength at 5/5 x4 extr, Cranial nerves 3-12

intact





- Studies


Laboratory Data (last 24 hrs)





21 09:38: PT 18.7 H, INR 1.62


21 09:38: WBC 9.10, Hgb 13.7, Hct 44.9, Plt Count 227


21 09:38: Sodium 140, Potassium 4.2, BUN 27 H, Creatinine 1.68 H, Glucose 

118 H, Magnesium 2.3, Total Bilirubin 1.1 H, AST 24, ALT 46, Alkaline 

Phosphatase 62








Assessment and Plan





- Problems (Diagnosis)


(1) Acute renal failure


Current Visit: Yes   Status: Acute   





(2) Acute exacerbation of CHF (congestive heart failure)


Current Visit: No   Status: Acute   





(3) Atrial flutter


Current Visit: No   Status: Chronic   


Qualifiers: 


   Atrial flutter type: unspecified   Qualified Code(s): I48.92 - Unspecified 

atrial flutter   





(4) CAD (coronary artery disease)


Current Visit: No   Status: Chronic   


Qualifiers: 


   Coronary Disease-Associated Artery/Lesion type: native artery   Native vs. 

transplanted heart: native heart   Associated angina: without angina   Qualified

Code(s): I25.10 - Atherosclerotic heart disease of native coronary artery 

without angina pectoris   





(5) ARMOND (obstructive sleep apnea)


Current Visit: No   Status: Chronic   





- Plan


Admit patient to the medical floor.


Start IV Lasix.


Monitor intake and output


Daily weight


Obtain echocardiogram


Continue home medications-flecainide, metoprolol and Eliquis for atrial 

fibrillation/atrial flutter.


Given patient was complaining of chest pain, added aspirin.


Trend troponin.


Patient currently in atrial flutter.


Cardiology consulted.


Monitor renal function on the IV Lasix.


Consulted nephrology to assist with management of ABEL.


Wean off oxygen as tolerated.








- Advance Directives


Does patient have a Living Will: No


Does patient have a Durable POA for Healthcare: No

## 2021-12-06 NOTE — ER
Nurse's Notes                                                                                     

 [facility]                                                                 

Name: Naren Douglas                                                                                  

Age: 61 yrs                                                                                       

Sex: Male                                                                                         

: 1960                                                                                   

MRN: [id number]                                                                                   

Arrival Date: 2021                                                                          

Time: 09:18                                                                                       

Account#: [id number]                                                                            

Bed 16                                                                                            

Private MD:                                                                                       

Diagnosis: Chest pain, unspecified;Systolic (congestive) heart failure;Paroxysmal atrial          

  fibrillation;Obesity, unspecified;Unspecified kidney failure                                    

                                                                                                  

Presentation:                                                                                     

                                                                                             

09:20 Chief complaint: Patient states: woke up at 2 am and started feeling SOB and dizzy,     iw  

      felt like he was in Afib, took his flecainide and that usually makes him feel better        

      but it hasn't gotten better yet , sees Dr. Driscoll. Coronavirus screen: At this time,       

      the client does not indicate any symptoms associated with coronavirus-19. Ebola Screen:     

      Patient negative for fever greater than or equal to 101.5 degrees Fahrenheit, and           

      additional compatible Ebola Virus Disease symptoms Patient denies exposure to               

      infectious person. Patient denies travel to an Ebola-affected area in the 21 days           

      before illness onset. No symptoms or risks identified at this time. Initial Sepsis          

      Screen: Does the patient meet any 2 criteria? No. Patient's initial sepsis screen is        

      negative. Does the patient have a suspected source of infection? No. Patient's initial      

      sepsis screen is negative. Risk Assessment: Do you want to hurt yourself or someone         

      else? Patient reports no desire to harm self or others. Onset of symptoms was 2021.                                                                                   

09:20 Method Of Arrival: Wheelchair                                                           iw  

09:20 Acuity: ELEANOR 2                                                                           iw  

                                                                                                  

Triage Assessment:                                                                                

09:25 General: Appears in no apparent distress. obese, well groomed, Behavior is calm,        tw2 

      cooperative, appropriate for age. Pain: Denies pain. Neuro: Reports dizziness.              

      Cardiovascular: Patient's skin is warm and dry. Respiratory: the patient has mild           

      shortness of breath. Respiratory: Airway is patent Respiratory effort is even,              

      unlabored, Respiratory pattern is regular, symmetrical. GI: No signs and/or symptoms        

      were reported involving the gastrointestinal system. Abdomen is round non-distended,        

      obese. Musculoskeletal: Range of motion: intact in all extremities.                         

09:31 Respiratory: Reports shortness of breath Onset: The symptoms/episode began/occurred     tw2 

      suddenly.                                                                                   

                                                                                                  

Historical:                                                                                       

- Allergies:                                                                                      

09:22 Iodinated Contrast Media - IV Dye;                                                      iw  

09:53 Iodine;                                                                                 tw2 

09:53 Codeine;                                                                                tw2 

- Home Meds:                                                                                      

09:22 Eliquis 5 mg Oral tab 1 tab 2 times per day [Active]; flecainide 100 mg Oral tab 1 tab  iw  

      every 12 hours [Active]; Klor-Con 10 10 mEq Oral TbER 1 tab 2 times per day [Active];       

      metoprolol tartrate 50 mg Oral tab 1 tab 2 times per day [Active]; Bumetanide Oral 2        

      times per day [Active];                                                                     

- PMHx:                                                                                           

09:22 Atrial Fib; Hypertension; Polycythemia;                                                 iw  

09:53 Sleep apnea;                                                                            tw2 

09:53 CPAP;                                                                                   tw2 

- PSHx:                                                                                           

09:22 None;                                                                                   iw  

                                                                                                  

- Immunization history:: Client reports receiving the Peter \T\ Peter single-dose             

  vaccine.                                                                                        

- Social history:: Smoking status: Patient/guardian denies using tobacco, Stopped _               

  months ago 1.                                                                                   

                                                                                                  

                                                                                                  

Screenin:31 Abuse screen: Denies threats or abuse. Nutritional screening: No deficits noted.        tw2 

      Tuberculosis screening: No symptoms or risk factors identified. Fall Risk Secondary         

      diagnosis (15 points) impaired mobility.                                                    

                                                                                                  

Assessment:                                                                                       

09:42 Reassessment: see triage assessment. Cardiovascular: Rhythm is atrial fibrillation With tw2 

      PVC's. Respiratory: Airway is patent Respiratory effort is even, unlabored, Respiratory     

      pattern is regular, symmetrical, n/a.                                                       

09:49 Reassessment: xray at bedside at this time.                                             tw2 

09:57 Neuro: Reports dizziness, after sitting up really quickly when xray was in the room.    tw2 

      provider notified.                                                                          

10:20 Reassessment: pt moaning out loud. stating "can i get some oxygen". pt noted to be at   tw2 

      99%. pt placed on o2 via 2L nc for pots comfort. will continue to monitor.                  

                                                                                                  

Vital Signs:                                                                                      

09:20  / 84; Pulse 72; Resp 18; Temp 97.0; Pulse Ox 99% on R/A; Weight 126.55 kg;       iw  

      Height 6 ft. 2 in. (187.96 cm);                                                             

10:30 BP 99 / 59 Supine; Pulse 77; Resp 14; Pulse Ox 100% on 2 lpm NC;                        tw2 

11:27  / 84; Pulse 77; Resp 17; Pulse Ox 100% on 10 lpm NC;                             tw2 

12:30  / 92; Pulse 64; Resp 17; Pulse Ox 100% on 8 lpm NC;                              tw2 

09:20 Body Mass Index 35.82 (126.55 kg, 187.96 cm)                                              

                                                                                                  

ED Course:                                                                                        

09:18 Patient arrived in ED.                                                                  kc5 

09:22 Triage completed.                                                                       iw  

09:23 Arm band placed on.                                                                     iw  

09:29 Nishi Braxton, RN is Primary Nurse.                                                   iw  

09:29 Qian Hernandez, RN is Primary Nurse.                                                        tw2 

09:31 EKG completed in triage. Results shown to MD.                                           tw2 

09:31 Bed in low position. Call light in reach. Cardiac monitor on. Pulse ox on. NIBP on.     tw2 

09:33 EKG done, by ED staff, reviewed by Geo Johnson MD.                                   em1 

09:34 Geo Johnson MD is Attending Physician.                                             [facility] 

09:38 Inserted saline lock: 20 gauge in left forearm, using aseptic technique. Blood          tw2 

      collected.                                                                                  

10:01 XRAY Chest (1 view) In Process Unspecified.                                             EDMS

10:51 Boogie Reddy is Hospitalizing Provider.                                               [facility] 

12:47 Awaiting: unsuccessful attempt to call report at this time.                             tw2 

13:00 No provider procedures requiring assistance completed. Patient admitted, IV remains in  tw2 

      place.                                                                                      

                                                                                                  

Administered Medications:                                                                         

10:54 Drug: Lasix (furosemide) 40 mg Route: IVP; Site: left forearm;                          tw2 

11:31 Follow up: Response: No adverse reaction                                                tw2 

                                                                                                  

                                                                                                  

Output:                                                                                           

11:27 Urine: 250ml (Voided); Total: 250ml.                                                    tw2 

                                                                                                  

Outcome:                                                                                          

10:52 Decision to Hospitalize by Provider.                                                    [facility] 

13:00 Admitted to Med/surg accompanied by tech, via wheelchair, room 421, with chart, Report  tw2 

      called to  SANJU Lyons                                                                        

13:00 Condition: stable                                                                           

13:00 Instructed on the need for admit.                                                           

13:31 Patient left the ED.                                                                    tw2 

                                                                                                  

Signatures:                                                                                       

Dispatcher [employer]                           EDMS                                                 

Geo Johnson MD MD cha Williams, Irene, RN RN                                                      

Raúl Ewing                               em1                                                  

Qian Hernandez RN RN   tw2                                                  

Ana Crowley                                 kc5                                                  

                                                                                                  

Corrections: (The following items were deleted from the chart)                                    

09:23 09:20 Pulse 72bpm; Resp 18bpm; Pulse Ox 99% RA; Temp 97.0F; 126.55 kg; Height 6 ft. 2   iw  

      in.; BMI: 35.8; iw                                                                          

09:23 09:22 Home Meds: furosemide 40 mg Oral tab 1 tab 2 times per day; iw                    iw  

                                                                                                  

**************************************************************************************************

## 2021-12-06 NOTE — RAD REPORT
EXAM DESCRIPTION:  RAD - Chest Single View - 12/6/2021 10:01 am

 

CLINICAL HISTORY:  SOB

Chest pain.

 

COMPARISON:  Chest Single View dated 8/10/2021; Chest Single View dated 12/16/2020; Chest Single View
 dated 12/14/2020; Chest Pa And Lat (2 Views) dated 10/27/2020

 

FINDINGS:  Portable technique limits examination quality.

 

Mild-to-moderate bilateral interstitial lung opacities are present probably representing pulmonary ed
ema. The heart is moderately enlarged. No displaced fractures.

 

IMPRESSION:  Mild to moderate CHF pattern.

## 2021-12-07 LAB
BUN BLD-MCNC: 32 MG/DL (ref 7–18)
COHGB MFR BLDA: 1.4 % (ref 0–1.5)
CREAT UR-SCNC: 202 MG/DL (ref 20–370)
GLUCOSE SERPLBLD-MCNC: 109 MG/DL (ref 74–106)
HCT VFR BLD CALC: 43.9 % (ref 39.6–49)
HDLC SERPL-MCNC: 23 MG/DL (ref 40–60)
LDLC SERPL CALC-MCNC: 78 MG/DL (ref ?–130)
LYMPHOCYTES # SPEC AUTO: 1.8 K/UL (ref 0.7–4.9)
MAGNESIUM SERPL-MCNC: 2.3 MG/DL (ref 1.8–2.4)
NT-PROBNP SERPL-MCNC: 2625 PG/ML (ref ?–125)
OXYHGB MFR BLDA: 91.4 % (ref 94–97)
PMV BLD: 8.9 FL (ref 7.6–11.3)
POTASSIUM SERPL-SCNC: 4.5 MMOL/L (ref 3.5–5.1)
PROT UR-MCNC: 34.5 MG/DL (ref ?–11.9)
RBC # BLD: 5.96 M/UL (ref 4.33–5.43)
SAO2 % BLDA: 93.6 % (ref 92–98.5)
UA DIPSTICK W REFLEX MICRO PNL UR: (no result)

## 2021-12-07 RX ADMIN — ACETAMINOPHEN PRN MG: 500 TABLET, FILM COATED ORAL at 01:03

## 2021-12-07 RX ADMIN — APIXABAN SCH MG: 5 TABLET, FILM COATED ORAL at 09:48

## 2021-12-07 RX ADMIN — ALBUMIN (HUMAN) SCH MG: 5 SOLUTION INTRAVENOUS at 09:47

## 2021-12-07 RX ADMIN — METOPROLOL TARTRATE SCH MG: 50 TABLET, FILM COATED ORAL at 21:25

## 2021-12-07 RX ADMIN — BUMETANIDE SCH MG: 0.25 INJECTION INTRAMUSCULAR; INTRAVENOUS at 21:25

## 2021-12-07 RX ADMIN — FLECAINIDE ACETATE SCH MG: 100 TABLET ORAL at 21:25

## 2021-12-07 RX ADMIN — Medication SCH ML: at 21:25

## 2021-12-07 RX ADMIN — METOPROLOL TARTRATE SCH MG: 50 TABLET, FILM COATED ORAL at 09:48

## 2021-12-07 RX ADMIN — FLECAINIDE ACETATE SCH MG: 100 TABLET ORAL at 06:29

## 2021-12-07 RX ADMIN — Medication PRN MG: at 21:25

## 2021-12-07 RX ADMIN — APIXABAN SCH MG: 5 TABLET, FILM COATED ORAL at 21:25

## 2021-12-07 RX ADMIN — Medication SCH ML: at 09:48

## 2021-12-07 NOTE — CON
Date of Consultation:  12/07/2021



Reason For Consultation:  Elevated BUN and creatinine, fluid management.



History Of Present Illness:  This is a pleasant 61-year-old gentleman with 
significant past medical history of obstructive sleep apnea, AFib, CAD 
complicated with congestive heart failure, never had MI according to the 
patient.  The patient had PTCA back in 2013, repeated cardiac cath back in 2017 
without any intervention.  The patient apparently had chronic kidney disease, 
baseline creatinine back in August 2021, 1.5 with GFR of 46.  The patient was in
his regular state of health, was admitted to the hospital back in August with 
COVID pneumonia.  According to the patient, lately he did not respond very well 
to his Lasix.  For that reason, the patient was switched from Lasix to Bumex.  
Yet, the patient continued to have shortness of breath and chest tightness.  For
that reason, he reported to the hospital.  Upon arrival to the hospital, the 
patient found to have elevation in BUN and creatinine.  Creatinine up to 1.8 
with GFR of 37.  For that reason, we have been consulted.  The patient denied 
taking any nonsteroidal.  No IV contrast.  No recent change in his medication, 
except switching from Lasix to Bumex recently as I mentioned above. 



The patient had orthopnea of 2 to 3 pillows and leg swelling.



Past Medical History:  Include:

1.   CAD complicated with congestive heart failure.  Echocardiogram done in 
2020.  At that time, ejection fraction of 51%.  Status post PTCA back in 2013 
and another angiogram in 2017.

2.   Obstructive sleep apnea.

3.   Chronic kidney disease, baseline creatinine 1.5, GFR of 46 as of August 2021.

4.   COVID pneumonia back in August 2021.



Allergies:  ALLERGY TO IODINE AND CODEINE.



Past Surgical History:  Include:

1.   PTCA, back in 2013 and angiogram in 2017.

2.   Hernia repair.

3.   Appendectomy.

4.   Bilateral knee arthroplasty.



Family History:  Positive for colon CA and CAD.



Social History:  Denies smoking, denies drinking, denies drug abuse.



Home Medications:  Include Eliquis, flecainide, metoprolol, atorvastatin, Lasix,
Bumex, and KCl.



Review of Systems:

Head and Neck:  No red eye.  No ear pain. 

GI:  No nausea, no vomiting. 

:  No polyuria, no dysuria.  Has foamy urine.  No hematuria. 

GYN:  Not applicable. 

RESPIRATORY:  Has shortness of breath. 

CARDIOVASCULAR:  Has orthopnea, has leg swelling. 

Endocrine:  No polydipsia. 

Skin:  No rash. 

Neuro:  Low back pain. 

Musculoskeletal:  No fatigue.  No joint pain.



Physical Examination:

Vital Signs:  When I saw the patient, the patient sitting in the bed on room 
air.  Slight shortness of breath.  Blood pressure 136/99, pulse of 85, afebrile.
 The patient had good urine output of 1000. 

Chest:  Crackles bilateral base. 

Heart:  S1, S2, systolic murmur. 

Abdomen:  Soft, nontender. 

Extremity:  Trace edema. 

Neuro:  Alert, oriented x3.  No focal.  No tremor.



Laboratory Data:  Sodium 139, potassium 4.5, bicarb 29, BUN 32, creatinine 1.8, 
GFR of 37, calcium of 9, phosphorus 3.8, magnesium 2.3.  WBC 9.6, H and H 
13.2/43.9.



Current Medications:  The patient on include:

1.   Albuterol.

2.   Eliquis.

3.   Flecainide.

4.   Metoprolol.

5.   Clonazepam.

6.   Zofran.

7.   Lasix 60 b.i.d.



Assessment And Plan:  

1.   Acute kidney injury on chronic kidney disease mostly secondary to 
cardiorenal over volume.  The patient apparently failed on the Lasix.  I am 
going to go ahead and change Lasix to Bumex.  We will send for basic workup 
including protein, creatinine, PTH, and vitamin D to evaluate the chronicity of 
the disease.  We will monitor the patient closely.  The patient does not have 
any anemia.  I am not going to send for protein electrophoresis.

2.   Hypertension with the presence of acute kidney injury.  We will hold on any
ACE inhibitor or ARB.  We will utilize the blood pressure for more diuresis and 
we will follow up.

3.   Congestive heart failure.  Coronary artery disease with exacerbation as 
above.  We will try to utilize the blood pressure for more diuresis to establish
better volume control.  We will follow up with Cardiology.

4.   Obstructive sleep apnea, as by primary.

5.   Edema secondary to cardiorenal.  We will send for protein, creatinine, and 
TSH and I am going to switch from Lasix to Bumex.  We will follow up. 



Thank you, Dr. Liu for allowing us to participate in the care of your patient.



time spend exam the patient face to face , placing order , reviewing the data of
lab and radiology , discussing with the staff including nusing , discussing with
other trevino member including hospitalist and other consultant 65 min

CICI

DD:  12/07/2021 13:54:43   Voice ID:  [id number]

DT:  12/07/2021 17:33:57   Report ID:  [id number]

MTDD

## 2021-12-07 NOTE — ECHO
HEIGHT: 6 ft 2 in   WEIGHT: 279 lb 0 oz   DATE OF STUDY: 12/07/2021   REFER DR: 
jesus manuel rojo

2-DIMENSIONAL: YES

     M.MODE: YES

 DOPPLER: YES

COLOR FLOW: YES



                    TDS:  NO

PORTABLE: NO

 DEFINITY:  NO

BUBBLE STUDY: NO





DIAGNOSIS:  CONGESTIVE HEART FAILURE



CARDIAC HISTORY:  

CATHERIZATION: YES

SURGERY: NO

PROSTHETIC VALVE: NO

PACEMAKER: NO





MEASUREMENTS (cm)

    DIASTOLIC (NORMALS)                 SYSTOLIC (NORMALS)

IVSd                 1.3 (0.6-1.2)                    LA Diam 4.8 (1.9-4.0)     LVEF       
  49%  

LVIDd               5.5 (3.5-5.7)                        LVIDs      4.1 (2.0-3.5)     %FS  
        25%

LVPWd             1.5 (0.6-1.2)

Ao Diam           3.5 (2.0-3.7)



2 DIMENSIONAL ASSESSMENT:

RIGHT ATRIUM:                   NORMAL

LEFT ATRIUM:       DILATED



RIGHT VENTRICLE:            NORMAL

LEFT VENTRICLE: LEFT VENTRICULAR HYPERTROPHY



TRICUSPID VALVE:             NORMAL

MITRAL VALVE:     NORMAL



PULMONIC VALVE:             NORMAL

AORTIC VALVE:     NORMAL



PERICARDIAL EFFUSION: NONE

AORTIC ROOT:      NORMAL





LEFT VENTRICULAR WALL MOTION:     MILD GLOBAL HYPOKINESIS.



DOPPLER/COLOR FLOW:     MODERATE TRICUSPID REGURGITATION. SEVERE PULMONARY HYPERTENSION. 



COMMENTS:      LEFT VENTRICULAR EJECTION FRACTION 49%. LEFT ATRIAL ENLARGEMENT. LEFT 
VENTRICULAR HYPERTROPHY. SEVERE PULMONARY HYPERTENSION 63 mmHg. MILD GLOBAL HYPOKINESIS.



TECHNOLOGIST:   MIGUELANGEL SUAREZ

## 2021-12-07 NOTE — RAD REPORT
EXAM DESCRIPTION:  US - Renal Ultrasound-Complete - 12/7/2021 7:35 pm

 

CLINICAL HISTORY:  Acute renal failure

 

COMPARISON:  None

 

FINDINGS:  The right kidney measures 13 cm with a normal echotexture.

 

The left kidney measures 12 cm with a normal echotexture.

 

Hydronephrosis is not seen. No gross abnormality of bladder

 

IMPRESSION:  Unremarkable renal ultrasound.

## 2021-12-07 NOTE — P.PN
Date of Service: 12/07/21





Subjective:


No significant changes, continues to require multiple medications for sedation


Difficult balance between oxygen saturation versus hypotension (effect from 

sedatives)





ROS: 


unable to be obtained, patient intubated





Physical exam


GEN: intubated, NAD


HEENT: Normal conjunctiva, sclera anicteric


CV: Regular rate and rhythm, trace edema


Pulm: on mech ventilation,  b/l coarse breath sounds R>L at bases


ABD: Soft, nondistended


Skin: no rash, no lesions


Robb in place





Problem List:


ABEL on likely CKD


Acute hypoxemic respiratory failure secondary to CHF exacerbation


Atrial fibrillation, unknown chronic vs paroxysmal


CAD s/p stent


ARMOND





renal function worsened, likely cardiorenal


nephrology consulted


Cardiology consulted given h/o CAD, worsening CHF


echo ordered


continue home meds- flecainide, metoprolol, Eliquis


possibility of PE, however patient has been taking Eliquis


monitor I/Os


monitor electrolytes/renal function


wean O2 as tolerated. patient not on O2 at home








Dispo: anticipate dc in 24-48hrs


 


Time Spent Managing Pts Care (In Minutes): 35

## 2021-12-08 LAB
ALBUMIN SERPL BCP-MCNC: 3.4 G/DL (ref 3.4–5)
ALP SERPL-CCNC: 62 U/L (ref 45–117)
ALT SERPL W P-5'-P-CCNC: 1033 U/L (ref 12–78)
AST SERPL W P-5'-P-CCNC: 872 U/L (ref 15–37)
BUN BLD-MCNC: 34 MG/DL (ref 7–18)
GLUCOSE SERPLBLD-MCNC: 115 MG/DL (ref 74–106)
HCT VFR BLD CALC: 41.4 % (ref 39.6–49)
INR BLD: 2.97
LYMPHOCYTES # SPEC AUTO: 1.1 K/UL (ref 0.7–4.9)
MAGNESIUM SERPL-MCNC: 2.1 MG/DL (ref 1.8–2.4)
PMV BLD: 8.8 FL (ref 7.6–11.3)
POTASSIUM SERPL-SCNC: 4 MMOL/L (ref 3.5–5.1)
RBC # BLD: 5.65 M/UL (ref 4.33–5.43)
TSH SERPL DL<=0.05 MIU/L-ACNC: 4.28 UIU/ML (ref 0.36–3.74)
URATE SERPL-MCNC: 14.7 MG/DL (ref 3.5–7.2)

## 2021-12-08 RX ADMIN — METOPROLOL TARTRATE SCH MG: 50 TABLET, FILM COATED ORAL at 09:21

## 2021-12-08 RX ADMIN — BUMETANIDE SCH MG: 0.25 INJECTION INTRAMUSCULAR; INTRAVENOUS at 21:33

## 2021-12-08 RX ADMIN — Medication SCH ML: at 09:22

## 2021-12-08 RX ADMIN — METOPROLOL TARTRATE SCH MG: 50 TABLET, FILM COATED ORAL at 21:35

## 2021-12-08 RX ADMIN — Medication SCH ML: at 21:35

## 2021-12-08 RX ADMIN — FLECAINIDE ACETATE SCH MG: 100 TABLET ORAL at 18:20

## 2021-12-08 RX ADMIN — FLECAINIDE ACETATE SCH MG: 100 TABLET ORAL at 10:24

## 2021-12-08 RX ADMIN — Medication PRN MG: at 21:33

## 2021-12-08 RX ADMIN — APIXABAN SCH MG: 5 TABLET, FILM COATED ORAL at 09:21

## 2021-12-08 RX ADMIN — BUMETANIDE SCH MG: 0.25 INJECTION INTRAMUSCULAR; INTRAVENOUS at 09:53

## 2021-12-08 RX ADMIN — APIXABAN SCH MG: 5 TABLET, FILM COATED ORAL at 21:33

## 2021-12-08 NOTE — P.PN
Date of Service: 12/08/21





Subjective:


some intermittent confusion yesterday


LFTs increased today, denies nausea/vomiting, no abd pain


h/o alcohol abuse, last drank ~30 days ago





ROS: 


as noted above, otherwise negative





Problem List:


ABEL on CKD


Acute hypoxemic respiratory failure secondary to CHF exacerbation


Atrial fibrillation, chronic


CAD s/p stent


elevated LFTs


ARMOND





renal function worsened yesterday, likely cardiorenal


nephrology consulted, lasix switched to bumex, breathing more comfortably


Cardiology consulted given h/o CAD, worsening CHF


Echo ordered, stress test ordered for today


Check hepatitis panel, liver work-up, liver/gallbladder ultrasound


continue home meds- flecainide, metoprolol, Eliquis


possibility of PE, however patient has been taking Eliquis


monitor I/Os


wean O2 as tolerated. patient not on O2 at home


Unclear etiology of elevated LFTs, does not seem to be receiving any medications

that would be contributing.  Possibly from volume overload/congestion








Dispo: anticipate dc in 24-48hrs


 


Time Spent Managing Pts Care (In Minutes): 35

## 2021-12-08 NOTE — RAD REPORT
EXAM DESCRIPTION:  US - Abdomen Exam Limited - 12/8/2021 6:07 pm

 

CLINICAL HISTORY:  elevated LFTs

 

COMPARISON:  Renal Ultrasound-Complete dated 12/7/2021

 

FINDINGS:  The gallbladder demonstrates no gallstones. Borderline thickened gallbladder wall. The gal
lbladder is distended. The common bile duct is normal measuring 4 mm.

 

The liver demonstrates no findings of intrahepatic biliary dilatation.

 

IMPRESSION:  Borderline nonspecific gallbladder wall thickening but no stones or other findings prese
nt to suggest acute cholecystitis. No biliary ductal dilatation.

## 2021-12-08 NOTE — RAD REPORT
EXAM DESCRIPTION:  NM - Rest Stress Cardiac Imaging - 12/8/2021 9:08 am

 

CLINICAL HISTORY:  Chest pain.

 

COMPARISON:  None.

 

TECHNIQUE:  The patient was administered 10. mCi of Tc 99m Sestamibi prior to resting SPECT imaging o
f the heart. The patient was then administered 30.  MCi of Tc 99m Sestamibi following exercise or pha
rmacologic stress. Multiplanar SPECT images were reviewed.

 

FINDINGS:   A large area of diminished radiotracer activity involves the inferior left ventricular my
ocardium on rest and stress sequences. Remainder the exam unremarkable

 

The left ventricular ejection fraction equals 30%

 

 

IMPRESSION:  Large fixed perfusion defect involving the inferior left ventricular myocardium likely a
n infarct

 

No evidence of stress-induced ischemia

## 2021-12-08 NOTE — PN
Subjective:  The patient was admitted with acute kidney injury secondary to 
cardiorenal CHF exacerbation.  The patient was started on diuresis yesterday.  
Has altered mental status.  The patient had good urine output.



Objective:  Vital Signs:  Blood pressure 146/94, pulse of 78, afebrile.  The 
patient had good urine output of 2300.  Negative of 2 L. 

Chest:  Crackles bilateral base. 

Heart:  S1, S2.  Systolic murmur. 

Abdomen:  Soft, nontender. 

Extremities:  Trace edema. 

Neurologic:  Alert.  No focality.  Oriented x3 today.



Laboratory Data:  WBC 11, H and H 12.6/41.4.  ABG; pH 7.46, CO2 34, O2 77, 
saturation of 93.  Sodium 136, potassium 4, bicarb 27, BUN 34, creatinine 1.7 
trending down.  GFR of 39, calcium 8.7, magnesium 2.1.  AST elevated 872, ALT 
1033, alkaline phosphatase within normal limit.  Albumin 3.4.  INR is 2.9.



Current Medications:  The patient on include:

1.   Albuterol.

2.   Eliquis.

3.   Metoprolol.

4.   Bumex 2 mg b.i.d.

5.   Zofran.

6.   Melatonin.



Assessment And Plan:  

1.   Acute kidney injury secondary to cardiorenal, still on the over volume 
side.  We will continue diuresis IV for another day today and we will monitor.  
Plan to switch to oral tomorrow.

2.   Hypertension.  We will utilize the blood pressure for more diuresis.

3.   Keep holding ACE inhibitor or ARB.

4.   Elevated LFTs could be liver congestion with history of alcohol 

use, elevated in INR.  We will follow up with the primary.

5.   Obstructive sleep apnea as by primary.



time spend exam the patient face to face , placing order , reviewing the data of
lab and radiology , discussing with the staff including nusing , discussing with
other trevino member including hospitalist and other consultant 45 min

CICI

DD:  12/08/2021 12:00:11   Voice ID:  [id number]

DT:  12/08/2021 12:44:01   Report ID:  [id number]

RUI

## 2021-12-09 LAB
ALBUMIN SERPL BCP-MCNC: 3.1 G/DL (ref 3.4–5)
ALP SERPL-CCNC: 61 U/L (ref 45–117)
ALT SERPL W P-5'-P-CCNC: 694 U/L (ref 12–78)
AST SERPL W P-5'-P-CCNC: 332 U/L (ref 15–37)
BUN BLD-MCNC: 33 MG/DL (ref 7–18)
COHGB MFR BLDA: 1.3 % (ref 0–1.5)
GLUCOSE SERPLBLD-MCNC: 138 MG/DL (ref 74–106)
HCT VFR BLD CALC: 37.5 % (ref 39.6–49)
LYMPHOCYTES # SPEC AUTO: 1 K/UL (ref 0.7–4.9)
MAGNESIUM SERPL-MCNC: 2.2 MG/DL (ref 1.8–2.4)
OXYHGB MFR BLDA: 95.6 % (ref 94–97)
PMV BLD: 9 FL (ref 7.6–11.3)
POTASSIUM SERPL-SCNC: 3.1 MMOL/L (ref 3.5–5.1)
RBC # BLD: 5.1 M/UL (ref 4.33–5.43)
SAO2 % BLDA: 97.9 % (ref 92–98.5)

## 2021-12-09 RX ADMIN — METOPROLOL TARTRATE SCH MG: 50 TABLET, FILM COATED ORAL at 20:54

## 2021-12-09 RX ADMIN — Medication PRN MG: at 20:54

## 2021-12-09 RX ADMIN — APIXABAN SCH MG: 5 TABLET, FILM COATED ORAL at 09:37

## 2021-12-09 RX ADMIN — BUMETANIDE SCH: 0.25 INJECTION INTRAMUSCULAR; INTRAVENOUS at 09:00

## 2021-12-09 RX ADMIN — BUMETANIDE SCH MG: 1 TABLET ORAL at 20:53

## 2021-12-09 RX ADMIN — APIXABAN SCH MG: 5 TABLET, FILM COATED ORAL at 20:54

## 2021-12-09 RX ADMIN — METOPROLOL TARTRATE SCH MG: 50 TABLET, FILM COATED ORAL at 09:37

## 2021-12-09 RX ADMIN — BUMETANIDE SCH MG: 1 TABLET ORAL at 12:35

## 2021-12-09 RX ADMIN — THIAMINE HYDROCHLORIDE SCH MG: 100 INJECTION, SOLUTION INTRAMUSCULAR; INTRAVENOUS at 20:53

## 2021-12-09 RX ADMIN — Medication SCH ML: at 09:00

## 2021-12-09 RX ADMIN — FLECAINIDE ACETATE SCH MG: 100 TABLET ORAL at 06:05

## 2021-12-09 RX ADMIN — THIAMINE HYDROCHLORIDE SCH MG: 100 INJECTION, SOLUTION INTRAMUSCULAR; INTRAVENOUS at 09:37

## 2021-12-09 RX ADMIN — Medication SCH ML: at 20:54

## 2021-12-09 RX ADMIN — FLECAINIDE ACETATE SCH MG: 100 TABLET ORAL at 18:27

## 2021-12-09 NOTE — P.PN
Date of Service: 12/09/21





Subjective:


Feeling better, used CPAP overnight, able to get some sleep.  No chest pain


Breathing more comfortably, off/on oxygen supplementation yesterday


Renal function improving, LFTs improving, denies any abdominal pain, no 

nausea/vomiting


no BM in ~2 days





ROS: 


as noted above, otherwise negative





Problem List:


ABEL on CKD


Acute hypoxemic respiratory failure secondary to CHF exacerbation


Atrial fibrillation, chronic


CAD s/p stent


elevated LFTs


ARMOND





Renal function improved, suspect ABEL secondary to cardiorenal/volume overload


nephrology consulted, lasix switched to bumex, breathing more comfortably.  Plan

to transition to p.o. Bumex monitor today


Cardiology consulted given h/o CAD, worsening CHF -stress test done on 12/8, 

negative


Check hepatitis panel, liver work-up, liver/gallbladder ultrasound - ok


continue home meds- flecainide, metoprolol, Eliquis


monitor I/Os


wean O2 as tolerated. patient not on O2 at home


Unclear etiology of elevated LFTs, does not seem to be receiving any medications

that would be contributing.  Possibly from volume overload/congestion


LFTs improving








Dispo: anticipate dc in 24hrs


 


Time Spent Managing Pts Care (In Minutes): 35

## 2021-12-09 NOTE — TREADPHA
DX:  ATRIAL FIBRILLATION



Date of Study: 12/08/2021





Ht: 6' 2 "

Wt:  279 lb 0 oz



Consulting Physician:  DAVID









MEDICATIONS:  ELIQUIS, BUMEX, KLONOPIN, LOPRESSOR, TAMBOCOR





HISTORY:  ONE CARDIAC STENT.





PHYSICIAL EXAMINATION: 

            



RESTING B.P.:  127/91

RESTING H.R.:  72





RESTING EKG:  ATRAL FIBRILLATION WITH SLOW VENTRICULAR RESPONSE. 



                    

            

PROTOCOL:  LEXISCAN

EXERCISE TIME:  3:30 

B.P. AT PEAK STRESS:  142/90









IMPRESSION:  LEXISCAN INJECTED. CARDIOLITE INJECTED PER PROTOCOL. SEE NUCLEAR MEDICINE 
REPORT. NO COMPLAINTS OF CHEST PAIN. PATIENT IN ATRIAL FIBRILLATION THROUGHOUT STUDY. 

COMPAINTS OF CHEST PAIN. PREMATURE VENTRICULAR COMPLEXES NOTED.

## 2021-12-09 NOTE — P.PN
Subjective


Date of Service: 12/09/21


Chief Complaint: Shortness of breath





Physical Examination





- Vital Signs


Temperature: 97.1 F


Blood Pressure: 125/87


Pulse: 69


Respirations: 19


Pulse Ox (%): 99





Assessment And Plan





- Plan





1.   Acute kidney injury secondary to cardiorenal, still on the over volume 

side.  We will continue diuresis IV for another day today and we will monitor.  

Plan to switch to oral tomorrow.


2.   Hypertension.  We will utilize the blood pressure for more diuresis.


3.   Keep holding ACE inhibitor or ARB.


4.   Elevated LFTs could be liver congestion with history of alcohol 


use, elevated in INR.  We will follow up with the primary.


5.   Obstructive sleep apnea as by primary.

## 2021-12-09 NOTE — PN
Date of Progress Note:  12/09/2021



Subjective:  The patient was admitted with acute kidney injury secondary to cardiorenal over volume. 
 The patient has been diuresed over the last 48 hours, responding very well.  Shortness of breath has
 been subsided significantly.  Kidney function trending down.



Physical Examination:

Vital Signs:  Blood pressure 117/85, pulse of 61, afebrile.  The patient had 2200 of urine output.  N
egative of 1100. 

Chest:  Clear to auscultation. 

Heart:  S1, S2, systolic murmur. 

Abdomen:  Soft, nontender. 

Extremity:  Trace edema. 

Neuro:  Alert, no focality.



Laboratory Data:  WBC 7.6, H and H 11.5/37.5.  Sodium 140, potassium 3.1, bicarb 30, BUN 33, creatini
ne 1.6, GFR of 43, calcium of 8, phosphorus 2.9, magnesium 2.2.  AST trending down to 332, ALT down t
o 694, albumin 3.1, corrected calcium 8.8.



Current Medications:  The patient on include:

1.Eliquis.

2.Flecainide.

3.Metoprolol.

4.Zofran.

5.Melatonin.

6.Bumex 2 mg IV b.i.d.

7.Thiamin.

8.KCl.



Assessment And Plan:  

1.Acute kidney injury secondary to cardiorenal, continued to improve.  I am going to switch Bumex to
 oral and we will monitor the patient.

2.Hypertension.  We will utilize blood pressure for diuresis.

3.Hypokalemia.  We will supplement.

4.Congestive heart failure with exacerbation as above.  We will optimize his diuresis.

5.Elevation in transaminase secondary to liver congestion as by 

primary.

6.Chronic obstructive pulmonary disease exacerbation as by primary.





MA/MODL

DD:  12/09/2021 11:19:46Voice ID:  [id number]

DT:  12/09/2021 11:54:03Report ID:  [id number]

## 2021-12-10 VITALS — OXYGEN SATURATION: 100 %

## 2021-12-10 VITALS — DIASTOLIC BLOOD PRESSURE: 68 MMHG | SYSTOLIC BLOOD PRESSURE: 107 MMHG

## 2021-12-10 VITALS — TEMPERATURE: 97.2 F

## 2021-12-10 LAB
ALBUMIN SERPL BCP-MCNC: 3 G/DL (ref 3.4–5)
ALP SERPL-CCNC: 63 U/L (ref 45–117)
ALT SERPL W P-5'-P-CCNC: 640 U/L (ref 12–78)
ANISOCYTOSIS BLD QL: (no result)
AST SERPL W P-5'-P-CCNC: 225 U/L (ref 15–37)
BLD SMEAR INTERP: (no result)
BUN BLD-MCNC: 28 MG/DL (ref 7–18)
GLUCOSE SERPLBLD-MCNC: 94 MG/DL (ref 74–106)
HCT VFR BLD CALC: 38.2 % (ref 39.6–49)
LYMPHOCYTES # SPEC AUTO: 1.1 K/UL (ref 0.7–4.9)
MORPHOLOGY BLD-IMP: (no result)
PMV BLD: 8.8 FL (ref 7.6–11.3)
POLYCHROMASIA BLD QL SMEAR: SLIGHT
POTASSIUM SERPL-SCNC: 3.5 MMOL/L (ref 3.5–5.1)
RBC # BLD: 5.18 M/UL (ref 4.33–5.43)
TARGETS BLD QL SMEAR: (no result)

## 2021-12-10 RX ADMIN — THIAMINE HYDROCHLORIDE SCH MG: 100 INJECTION, SOLUTION INTRAMUSCULAR; INTRAVENOUS at 07:47

## 2021-12-10 RX ADMIN — BUMETANIDE SCH MG: 1 TABLET ORAL at 07:47

## 2021-12-10 RX ADMIN — APIXABAN SCH MG: 5 TABLET, FILM COATED ORAL at 07:47

## 2021-12-10 RX ADMIN — Medication SCH ML: at 07:48

## 2021-12-10 RX ADMIN — FLECAINIDE ACETATE SCH MG: 100 TABLET ORAL at 06:17

## 2021-12-10 RX ADMIN — METOPROLOL TARTRATE SCH: 50 TABLET, FILM COATED ORAL at 07:48

## 2021-12-10 NOTE — P.PN
Subjective


Date of Service: 12/10/21


Chief Complaint: Shortness of breath





Physical Examination





- Vital Signs


Temperature: 97.9 F


Blood Pressure: 100/63


Pulse: 70


Respirations: 19


Pulse Ox (%): 97





Assessment And Plan





- Plan





1.   Acute kidney injury secondary to cardiorenal, continued to improve.  I am 

going to switch Bumex to oral and we will monitor the patient.


2.   Hypertension.  We will utilize blood pressure for diuresis.


3.   Hypokalemia.  We will supplement.


4.   Congestive heart failure with exacerbation as above.  We will optimize his 

diuresis.


5.   Elevation in transaminase secondary to liver congestion as by 


primary.


6.   Chronic obstructive pulmonary disease exacerbation as by primary.

## 2021-12-10 NOTE — P.DS
Admission Date: 12/06/21


Discharge Date: 12/10/21


Disposition: ROUTINE DISCHARGE


Discharge Condition: GOOD


Reason for Admission: Shortness of breath


Consultations: 


Nephrology - Dr. Lopez / Dr. Hickey





Procedures: 


CXR (12/6): 


Mild-to-moderate bilateral interstitial lung opacities are present probably 

representing pulmonary edema. The heart is moderately enlarged. No displaced 

fractures.


 IMPRESSION:  Mild to moderate CHF pattern.





Renal U/S (12/7): 


IMPRESSION:  Unremarkable renal ultrasound.





Abd U/S (12/8): 


IMPRESSION:  Borderline nonspecific gallbladder wall thickening but no stones or

other findings present to suggest acute cholecystitis. No biliary ductal 

dilatation.





Nuclear Stress Test (12/8): 


IMPRESSION:  Large fixed perfusion defect involving the inferior left 

ventricular myocardium likely an infarct


 No evidence of stress-induced ischemia





Echo (12/7): 


MODERATE TRICUSPID REGURGITATION. SEVERE PULMONARY HYPERTENSION. 


LVEF: 49%. LEFT ATRIAL ENLARGEMENT. 


LEFT VENTRICULAR HYPERTROPHY. 


SEVERE PULMONARY HYPERTENSION 63 mmHg. MILD GLOBAL HYPOKINESIS.





Problem List:


ABEL on CKD


Acute hypoxemic respiratory failure secondary to diastolic CHF exacerbation


Severe Pulmonary Hypertension


Moderate Tricuspid regurgitation


Atrial fibrillation, chronic


CAD s/p stent


elevated LFTs


ARMOND





Brief History of Present Illness: 


62-year-old gentleman with a history of atrial fibrillation, coronary artery 

disease and obstructive sleep apnea presented to the emergency department with a

complaint of shortness of breath of sudden onset last night.  Patient reports 

shortness of breath which woke him up from sleep.  Symptoms associated with 

burning sensation in the chest.  He denied any diaphoresis or nausea.  He has 

nonproductive cough.  Chest x-ray done in the emergency department demonstrate 

pulmonary vascular congestion.  EKG showed atrial flutter which is rate 

controlled.  Patient is on chronic anticoagulation with Eliquis.  He was 

admitted in August 2021 and spent about a month in the hospital for Covid zacankit rao.  His serum creatinine is elevated above baseline.  Patient given a dose 

of IV Lasix.  He is admitted for further management.





Hospital Course: 


Shortness of breath and renal function improved with aggressive diuresis. 

Patient's LFTs were noted to increase to ~1000 on hospital day 2, felt secondary

to volume overload/congestion, and improved with further diuresis. Liver and 

renal U/S was unremarkable. Cardiology was consulted given h/o CAD, worsening 

CHF and chest discomfort. Echo and Stress test were performed - negative stress,

and cardiology recommended no further workup /procedures at this time.


Discharged home to continue prior medications as prescribed. Patient may benefit

from addition of PDE inhibitor in the near future.


Follow up with PCP in 1 week


Follow up with Cardiology 





Vital Signs/Physical Exam: 














Temp Pulse Resp BP Pulse Ox


 


 97.2 F   54   21 H  107/68   100 


 


 12/10/21 08:00  12/10/21 08:00  12/10/21 08:00  12/10/21 08:00  12/10/21 08:00








General: Alert, In no apparent distress, Oriented x3


HEENT: EOMI, Sclerae nonicteric


Neck: No LAD


Respiratory: Clear to auscultation bilaterally, Normal air movement


Cardiovascular: No edema, Regular rate/rhythm


Gastrointestinal: Soft and benign, Non-distended, No tenderness


Musculoskeletal: No tenderness


Integumentary: No rashes, No significant lesion


Neurological: Normal speech, Normal affect


Laboratory Data at Discharge: 














WBC  7.30 K/uL (4.3-10.9)   12/10/21  04:31    


 


Hgb  11.9 g/dL (13.6-17.9)  L  12/10/21  04:31    


 


Hct  38.2 % (39.6-49.0)  L  12/10/21  04:31    


 


Plt Count  160 K/uL (152-406)   12/10/21  04:31    


 


PT  34.5 SECONDS (9.5-12.5)  H  12/08/21  09:35    


 


INR  2.97   12/08/21  09:35    


 


APTT  33.8 SECONDS (24.3-36.9)   12/08/21  09:35    


 


Sodium  140 mmol/L (136-145)   12/10/21  04:31    


 


Potassium  3.5 mmol/L (3.5-5.1)   12/10/21  04:31    


 


BUN  28 mg/dL (7-18)  H  12/10/21  04:31    


 


Creatinine  1.49 mg/dL (0.55-1.3)  H  12/10/21  04:31    


 


Glucose  94 mg/dL ()   12/10/21  04:31    


 


Uric Acid  14.7 mg/dL (3.5-7.2)  H  12/08/21  03:30    


 


Phosphorus  2.9 mg/dL (2.5-4.9)   12/09/21  03:07    


 


Magnesium  2.2 mg/dL (1.8-2.4)   12/09/21  03:07    


 


Total Bilirubin  1.0 mg/dL (0.2-1.0)   12/10/21  04:31    


 


AST  225 U/L (15-37)  H D 12/10/21  04:31    


 


ALT  640 U/L (12-78)  H*  12/10/21  04:31    


 


Alkaline Phosphatase  63 U/L ()   12/10/21  04:31    


 


Troponin I  0.03 ng/mL (0.0-0.045)   12/06/21  19:10    


 


Triglycerides  104 mg/dL (<150)   12/07/21  04:38    


 


Cholesterol  122 mg/dL (<200)   12/07/21  04:38    


 


HDL Cholesterol  23 mg/dL (40-60)  L  12/07/21  04:38    


 


Cholesterol/HDL Ratio  5.30   12/07/21  04:38    








Home Medications: 








Apixaban [Eliquis] 1 tab PO BID 12/06/21 


Bumetanide 1 tab PO BID 12/06/21 


Flecainide [Tambocor*] 1 tab PO Q12H 12/06/21 


Metoprolol Tartrate 1 tab PO BID 12/06/21 


Potassium Oral Tab [Klor-Con 10 mEq Tab*] 1 tab PO BID 12/06/21 





Physician Discharge Instructions: 


You were found to be volume overloaded with fluid in your lungs and swelling in 

your legs.  You had improvement with higher dose diuretics. You were evaluated 

by cardiology and underwent echocardiogram and cardiac stress testing which did 

not reveal any new findings, and cardiology recommended no further work up / no 

need for cardiac catheterization.


Your renal function was noted to be decreased and improved with diuresis as 

well. Nephrology was consulted and you were weaned down to your home dose of 

bumex with continued good response.


Your liver enzymes were noted to be high, secondary to volume overload / 

congestion as well. This continued to improve daily as the fluid was removed.


Resume home medications on discharge.


Recommend follow up with PCP in ~1 week


Follow up with Nephrology in ~1 month


Follow up with Cardiology in ~1 month





Diet: AHA


Activity: Ad vilma


Followup: 


NONE,NONE [Primary Care Provider] - 1 Week (Call and schedule for appointment)


Time spent managing pt's care (in minutes): 45

## 2021-12-11 LAB
1,25(OH)2D SERPL-MCNC: 27 PG/ML (ref 18–72)
1,25(OH)2D2 SERPL-MCNC: <8 PG/ML

## 2021-12-11 NOTE — PN
Date of Progress Note:  12/08/2021



Subjective:  Mr. Douglas had came in with chest pain, mild congestive heart failure, ejection fraction o
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  Str
ess test showed scar, but no ischemia.  I would continue his regimen for CAD and mild CHF.  Consider 
treating him with thiamine and plan to discharge him in the next day or two.  Case was discussed with
 primary care physician, was seen in the office.  After he gets discharged, no plan for catheterizati
on at this point.





NB/JAQUELINE

DD:  12/11/2021 18:43:55Voice ID:  [id number]

DT:  12/11/2021 19:11:10Report ID:  [id number]

## 2021-12-13 NOTE — PN
Date of Progress Note:  12/07/2021



Subjective:  Mr. Douglas had been admitted with CHF, ejection fraction 49%, known history of alcohol abu
se.  Continues to have shortness of breath and some what looks like withdrawal symptoms.  Has stephon esposito.  I recommend we get a Lexiscan on him and I recommend we double his IV Lasix and we will see wh
at the stress test shows before making further decision.  Again, his EF was 49%.





NB/JAQUELINE

DD:  12/13/2021 08:30:36Voice ID:  [id number]

DT:  12/13/2021 09:22:39Report ID:  [id number]

## 2022-03-04 ENCOUNTER — HOSPITAL ENCOUNTER (INPATIENT)
Dept: HOSPITAL 97 - ER | Age: 62
LOS: 3 days | Discharge: HOME | DRG: 291 | End: 2022-03-07
Attending: INTERNAL MEDICINE | Admitting: INTERNAL MEDICINE
Payer: COMMERCIAL

## 2022-03-04 VITALS — BODY MASS INDEX: 39.2 KG/M2

## 2022-03-04 DIAGNOSIS — I48.91: ICD-10-CM

## 2022-03-04 DIAGNOSIS — I48.92: ICD-10-CM

## 2022-03-04 DIAGNOSIS — N18.31: ICD-10-CM

## 2022-03-04 DIAGNOSIS — I27.20: ICD-10-CM

## 2022-03-04 DIAGNOSIS — Z20.822: ICD-10-CM

## 2022-03-04 DIAGNOSIS — D45: ICD-10-CM

## 2022-03-04 DIAGNOSIS — E16.2: ICD-10-CM

## 2022-03-04 DIAGNOSIS — I25.10: ICD-10-CM

## 2022-03-04 DIAGNOSIS — I50.23: ICD-10-CM

## 2022-03-04 DIAGNOSIS — I13.0: Primary | ICD-10-CM

## 2022-03-04 DIAGNOSIS — R06.03: ICD-10-CM

## 2022-03-04 DIAGNOSIS — G47.33: ICD-10-CM

## 2022-03-04 LAB
ALBUMIN SERPL BCP-MCNC: 3.9 G/DL (ref 3.4–5)
ALP SERPL-CCNC: 59 U/L (ref 45–117)
ALT SERPL W P-5'-P-CCNC: 29 U/L (ref 12–78)
ANISOCYTOSIS BLD QL: (no result)
AST SERPL W P-5'-P-CCNC: 19 U/L (ref 15–37)
BLD SMEAR INTERP: (no result)
BUN BLD-MCNC: 16 MG/DL (ref 7–18)
GLUCOSE SERPLBLD-MCNC: 92 MG/DL (ref 74–106)
HCT VFR BLD CALC: 44.3 % (ref 39.6–49)
INR BLD: 1.76
LYMPHOCYTES # SPEC AUTO: 0.5 K/UL (ref 0.7–4.9)
MAGNESIUM SERPL-MCNC: 2.1 MG/DL (ref 1.8–2.4)
MORPHOLOGY BLD-IMP: (no result)
NT-PROBNP SERPL-MCNC: 1188 PG/ML (ref ?–125)
PMV BLD: 8.3 FL (ref 7.6–11.3)
POTASSIUM SERPL-SCNC: 3.9 MMOL/L (ref 3.5–5.1)
RBC # BLD: 6.06 M/UL (ref 4.33–5.43)
TROPONIN I SERPL HS-MCNC: 64.2 PG/ML (ref ?–58.9)
TSH SERPL DL<=0.05 MIU/L-ACNC: 2.38 UIU/ML (ref 0.36–3.74)

## 2022-03-04 PROCEDURE — 81003 URINALYSIS AUTO W/O SCOPE: CPT

## 2022-03-04 PROCEDURE — 36415 COLL VENOUS BLD VENIPUNCTURE: CPT

## 2022-03-04 PROCEDURE — 80048 BASIC METABOLIC PNL TOTAL CA: CPT

## 2022-03-04 PROCEDURE — 85025 COMPLETE CBC W/AUTO DIFF WBC: CPT

## 2022-03-04 PROCEDURE — 84100 ASSAY OF PHOSPHORUS: CPT

## 2022-03-04 PROCEDURE — 93306 TTE W/DOPPLER COMPLETE: CPT

## 2022-03-04 PROCEDURE — 84443 ASSAY THYROID STIM HORMONE: CPT

## 2022-03-04 PROCEDURE — 71045 X-RAY EXAM CHEST 1 VIEW: CPT

## 2022-03-04 PROCEDURE — 83735 ASSAY OF MAGNESIUM: CPT

## 2022-03-04 PROCEDURE — 93005 ELECTROCARDIOGRAM TRACING: CPT

## 2022-03-04 PROCEDURE — 74019 RADEX ABDOMEN 2 VIEWS: CPT

## 2022-03-04 PROCEDURE — 83880 ASSAY OF NATRIURETIC PEPTIDE: CPT

## 2022-03-04 PROCEDURE — 85610 PROTHROMBIN TIME: CPT

## 2022-03-04 PROCEDURE — 99285 EMERGENCY DEPT VISIT HI MDM: CPT

## 2022-03-04 PROCEDURE — 84439 ASSAY OF FREE THYROXINE: CPT

## 2022-03-04 PROCEDURE — 80053 COMPREHEN METABOLIC PANEL: CPT

## 2022-03-04 PROCEDURE — 80061 LIPID PANEL: CPT

## 2022-03-04 PROCEDURE — 94660 CPAP INITIATION&MGMT: CPT

## 2022-03-04 PROCEDURE — 96374 THER/PROPH/DIAG INJ IV PUSH: CPT

## 2022-03-04 PROCEDURE — 82947 ASSAY GLUCOSE BLOOD QUANT: CPT

## 2022-03-04 PROCEDURE — 82805 BLOOD GASES W/O2 SATURATION: CPT

## 2022-03-04 PROCEDURE — 84484 ASSAY OF TROPONIN QUANT: CPT

## 2022-03-04 PROCEDURE — 80076 HEPATIC FUNCTION PANEL: CPT

## 2022-03-04 RX ADMIN — METOPROLOL TARTRATE SCH MG: 50 TABLET, FILM COATED ORAL at 23:22

## 2022-03-04 RX ADMIN — Medication SCH ML: at 23:23

## 2022-03-04 RX ADMIN — FLECAINIDE ACETATE SCH MG: 100 TABLET ORAL at 23:24

## 2022-03-04 RX ADMIN — METOPROLOL TARTRATE SCH: 50 TABLET, FILM COATED ORAL at 19:44

## 2022-03-04 NOTE — EDPHYS
Physician Documentation                                                                           

 [facility]                                                                 

Name: Naren Douglas                                                                                  

Age: 61 yrs                                                                                       

Sex: Male                                                                                         

: 1960                                                                                   

MRN: [id number]                                                                                   

Arrival Date: 2022                                                                          

Time: 13:34                                                                                       

Account#: [id number]                                                                            

Bed 5                                                                                             

Private MD:                                                                                       

ED Physician Geo Johnson                                                                      

HPI:                                                                                              

                                                                                             

14:04 This 61 yrs old  Male presents to ER via Ambulatory with complaints of         pam 

      Dizziness.                                                                                  

14:04 The patient presents with dizziness, generalized weakness. Onset: The symptoms/episode  pam 

      began/occurred 2 day(s) ago. Patient's baseline: Neuro: alert and fully oriented.           

                                                                                                  

Historical:                                                                                       

- Allergies:                                                                                      

13:54 Codeine;                                                                                [facility] 

13:54 Iodinated Contrast Media - IV Dye;                                                      [facility] 

13:54 Iodine;                                                                                 6 

- Home Meds:                                                                                      

13:54 Bumetanide Oral 2 times per day [Active]; Eliquis 5 mg Oral tab 1 tab 2 times per day   [facility] 

      [Active]; flecainide 100 mg Oral tab 1 tab every 12 hours [Active]; Klor-Con 10 10 mEq      

      Oral TbER 1 tab 2 times per day [Active]; metoprolol tartrate 50 mg Oral tab 1 tab 2        

      times per day [Active];                                                                     

- PMHx:                                                                                           

13:54 Atrial Fib; CPAP; Hypertension; Polycythemia; Sleep Apnea;                              6 

                                                                                                  

- Immunization history:: Adult Immunizations up to date, Client reports receiving the             

  2nd dose of the Covid vaccine.                                                                  

- Social history:: Smoking status: Patient denies any tobacco usage or history of.                

                                                                                                  

                                                                                                  

ROS:                                                                                              

14:05 Constitutional: Negative for fever, chills, and weight loss, Eyes: Negative for injury, pam 

      pain, redness, and discharge, ENT: Negative for injury, pain, and discharge, Neck:          

      Negative for injury, pain, and swelling, Respiratory: Negative for shortness of breath,     

      cough, wheezing, and pleuritic chest pain, Abdomen/GI: Negative for abdominal pain,         

      nausea, vomiting, diarrhea, and constipation, Back: Negative for injury and pain, :       

      Negative for injury, bleeding, discharge, and swelling, MS/Extremity: Negative for          

      injury and deformity, Skin: Negative for injury, rash, and discoloration, Neuro:            

      Negative for headache, weakness, numbness, tingling, and seizure, Psych: Negative for       

      depression, anxiety, suicide ideation, homicidal ideation, and hallucinations,              

      Allergy/Immunology: Negative for hives, rash, and allergies, Endocrine: Negative for        

      neck swelling, polydipsia, polyuria, polyphagia, and marked weight changes,                 

      Hematologic/Lymphatic: Negative for swollen nodes, abnormal bleeding, and unusual           

      bruising.                                                                                   

14:05 Cardiovascular: Positive for palpitations.                                                  

                                                                                                  

Exam:                                                                                             

14:05 Constitutional:  This is a well developed, well nourished patient who is awake, alert,  pam 

      and in no acute distress. Head/Face:  Normocephalic, atraumatic. Eyes:  Pupils equal        

      round and reactive to light, extra-ocular motions intact.  Lids and lashes normal.          

      Conjunctiva and sclera are non-icteric and not injected.  Cornea within normal limits.      

      Periorbital areas with no swelling, redness, or edema. ENT:  Nares patent. No nasal         

      discharge, no septal abnormalities noted.  Tympanic membranes are normal and external       

      auditory canals are clear.  Oropharynx with no redness, swelling, or masses, exudates,      

      or evidence of obstruction, uvula midline.  Mucous membranes moist. Neck:  Trachea          

      midline, no thyromegaly or masses palpated, and no cervical lymphadenopathy.  Supple,       

      full range of motion without nuchal rigidity, or vertebral point tenderness.  No            

      Meningismus. Chest/axilla:  Normal chest wall appearance and motion.  Nontender with no     

      deformity.  No lesions are appreciated. Respiratory:  Lungs have equal breath sounds        

      bilaterally, clear to auscultation and percussion.  No rales, rhonchi or wheezes noted.     

       No increased work of breathing, no retractions or nasal flaring. Abdomen/GI:  Soft,        

      non-tender, with normal bowel sounds.  No distension or tympany.  No guarding or            

      rebound.  No evidence of tenderness throughout. Back:  No spinal tenderness.  No            

      costovertebral tenderness.  Full range of motion. Male :  Normal genitalia with no        

      discharge or lesions. Skin:  Warm, dry with normal turgor.  Normal color with no            

      rashes, no lesions, and no evidence of cellulitis. MS/ Extremity:  Pulses equal, no         

      cyanosis.  Neurovascular intact.  Full, normal range of motion. Neuro:  Awake and           

      alert, GCS 15, oriented to person, place, time, and situation.  Cranial nerves II-XII       

      grossly intact.  Motor strength 5/5 in all extremities.  Sensory grossly intact.            

      Cerebellar exam normal.  Normal gait. Psych:  Awake, alert, with orientation to person,     

      place and time.  Behavior, mood, and affect are within normal limits.                       

14:05 Cardiovascular: Rate: tachycardic, actual rate is  125 bpm, Rhythm: irregular, Pulses:      

      Pulses are 4+ in bilateral radial, brachial, femoral, popliteal, posterior tibial and       

      and dorsalis pedis arteries.. Heart sounds: normal, Edema: is not appreciated, JVD: is      

      not appreciated.                                                                            

                                                                                                  

Vital Signs:                                                                                      

13:52  / 88; Pulse 50; Resp 20; Temp 98.1(T); Pulse Ox 99% ; Weight 138.35 kg; Height 6 jh6 

      ft. 2 in. (187.96 cm); Pain 3/10;                                                           

14:39  / 84; Pulse 107; Resp 20; Pulse Ox 100% ; Pain 3/10;                             jh6 

15:30  / 95; Pulse 105; Resp 23; Pulse Ox 100% ;                                        bp  

16:30  / 98; Pulse 97; Resp 14; Pulse Ox 100% ;                                         bp  

17:22  / 90; Pulse 101; Resp 23; Pulse Ox 98% ;                                         bp  

13:52 Body Mass Index 39.16 (138.35 kg, 187.96 cm)                                            jh6 

                                                                                                  

MDM:                                                                                              

13:46 Patient medically screened.                                                             pam 

14:07 Differential diagnosis: Anxiety Reaction asthma, arrythmia, dehydration, Myocardial     pam 

      Infarction pneumonia, pulmonary edema, Sepsis. Antibiotic administration: Not               

      indicated. Differential diagnosis: cardiac arrhythmia, CVA, generalized weakness,           

      hypovolemia, idiopathic dizziness, TIA. The patient's Wells Deep Vein Thrombosis Score      

      was calculated as follows: Heart Rate >100 BPM (1.5 Pts) Total Score: 0-2 Pts- Low          

      Risk. The patient's pulmonary embolism risk score was calculated as follows: the            

      patients heart rate is greater than 100 beats per minute (1.5 Pts) Total Score: 0-2         

      points. This patient was found to be at low risk for a pulmonary embolism by using the      

      Well's assessment criteria. Immunization status: Influenza vaccine: Not up to date.         

      Data reviewed: vital signs, nurses notes, lab test result(s), EKG, radiologic studies,      

      plain films. Data interpreted: Cardiac monitor: rate is 120 beats/min, rhythm is atrial     

      fibrillation, Pulse oximetry: on room air is 99 %. Test interpretation: by ED physician     

      or midlevel provider: ECG, plain radiologic studies.                                        

                                                                                                  

                                                                                             

13:57 Order name: Basic Metabolic Panel; Complete Time: 15:08                                 [facility] 

                                                                                             

13:57 Order name: CBC with Diff; Complete Time: 15:08                                         [facility] 

                                                                                             

13:57 Order name: LFT's; Complete Time: 15:08                                                 [facility] 

                                                                                             

13:57 Order name: Magnesium; Complete Time: 15:08                                             [facility] 

                                                                                             

13:57 Order name: NT PRO-BNP; Complete Time: 15:08                                            [facility] 

                                                                                             

13:57 Order name: PT-INR; Complete Time: 15:08                                                [facility] 

                                                                                             

13:57 Order name: Troponin HS; Complete Time: 15:08                                           [facility] 

                                                                                             

13:57 Order name: XRAY Chest (1 view)                                                         [facility] 

                                                                                             

13:57 Order name: TSH; Complete Time: 15:08                                                   [facility] 

                                                                                             

15:03 Order name: Urine Dipstick-Ancillary; Complete Time: 15:08                              [facility]

                                                                                             

15:05 Order name: CBC Smear Scan; Complete Time: 15:08                                        [facility]

                                                                                             

16:07 Order name: SARS-COV-2 RT PCR (Document "Date of Onset" if Symptomatic)                   

                                                                                             

17:06 Order name: Urine Dipstick-Ancillary                                                    [facility]

                                                                                             

13:57 Order name: EKG; Complete Time: 13:58                                                   [facility] 

                                                                                             

13:57 Order name: Cardiac monitoring; Complete Time: 14:41                                    [facility] 

                                                                                             

13:57 Order name: EKG - Nurse/Tech; Complete Time: 14:41                                      [facility] 

                                                                                             

13:57 Order name: IV Saline Lock; Complete Time: 14:41                                        [facility] 

                                                                                             

13:57 Order name: Labs collected and sent; Complete Time: 14:41                               [facility] 

                                                                                             

13:57 Order name: O2 Per Protocol; Complete Time: 14:41                                       [facility] 

                                                                                             

13:57 Order name: O2 Sat Monitoring; Complete Time: 14:41                                     [facility] 

                                                                                             

13:57 Order name: Urine Dipstick-Ancillary (obtain specimen); Complete Time: 17:19            [facility] 

                                                                                             

16:12 Order name: CONS Physician Consult                                                      [facility]

                                                                                                  

Administered Medications:                                                                         

15:08 Discontinued: NS 0.9% 1000 ml IV at 75 ml/hr continuous                                 pam 

14:52 Drug: NS 0.9% 1000 ml Route: IV; Rate: 75 ml/hr; Site: left antecubital;                jh6 

15:33 Drug: Eliquis (apixaban) 5 mg Route: PO;                                                jh6 

16:29 Follow up: Response: No adverse reaction                                                bp  

15:34 Drug: Lasix (furosemide) 40 mg Route: IVP; Site: left antecubital;                      jh6 

16:29 Follow up: Response: No adverse reaction                                                bp  

15:34 Drug: Aspirin 81 mg Route: PO;                                                          jh6 

16:29 Follow up: Response: No adverse reaction                                                bp  

16:55 Drug: Ibuprofen 800 mg Route: PO;                                                       bp  

17:06 Follow up: Response: No adverse reaction                                                bp  

17:06 Not Given (Other Intervention Used): morphine 2 mg IVP once; (PAIN>8) RASS on ADMN:     bp  

      Combtv4, Very Agttd3, Agttd2, Rstlss1, AlertClm0, Drwsy-1, LtSdtn-2, ModSdtn-3,             

      DpSdtn-4, UnArsble-5 x2                                                                     

17:06 Not Given (Other Intervention Used): Zofran (Ondansetron) 4 mg IVP once; over 2 minutes bp  

                                                                                                  

                                                                                                  

Disposition Summary:                                                                              

22 15:12                                                                                    

Hospitalization Ordered                                                                           

      Hospitalization Status: Inpatient Admission                                             pam 

      Provider: Tacos Liu cha 

      Location: Telemetry/MedSurg (Inpatient)                                                 pam 

      Condition: Fair                                                                         pam 

      Problem: new                                                                            pam 

      Symptoms: have improved                                                                 pam 

      Bed/Room Type: Standard                                                                 pam 

      Room Assignment: 219(22 18:27)                                                    iw  

      Diagnosis                                                                                   

        - Chronic atrial fibrillation                                                         pam 

        - Diastolic (congestive) heart failure                                                pam 

        - Obesity, unspecified                                                                pam 

        - Essential (primary) hypertension                                                    pam 

        - Abnormal serum enzyme level, unspecified - troponin                                 pam 

      Forms:                                                                                      

        - Medication Reconciliation Form                                                      pam 

        - SBAR form                                                                           pam 

Signatures:                                                                                       

Dispatcher [employer]                           Geo Buenrostro MD MD cha Williams, Irene RN                     RN   iw                                                   

Gonzalo Miranda RN RN   bp                                                   

Becky Navarrete RN                   RN   6                                                  

                                                                                                  

Corrections: (The following items were deleted from the chart)                                    

18:27 15:12 pam                                                                               iw  

                                                                                                  

**************************************************************************************************

## 2022-03-04 NOTE — ER
Nurse's Notes                                                                                     

 [facility]                                                                 

Name: Naren Douglas                                                                                  

Age: 61 yrs                                                                                       

Sex: Male                                                                                         

: 1960                                                                                   

MRN: [id number]                                                                                   

Arrival Date: 2022                                                                          

Time: 13:34                                                                                       

Account#: [id number]                                                                            

Bed 5                                                                                             

Private MD:                                                                                       

Diagnosis: Chronic atrial fibrillation;Diastolic (congestive) heart failure;Obesity,              

  unspecified;Essential (primary) hypertension;Abnormal serum enzyme level,                       

  unspecified-troponin                                                                            

                                                                                                  

Presentation:                                                                                     

                                                                                             

13:52 Chief complaint: Patient states: body aches and sob starting this am. no fever at home  jh6 

      and no cough or congestion. Coronavirus screen: Vaccine status: Patient reports             

      receiving the 2nd dose of the covid vaccine. Ebola Screen: Patient denies exposure to       

      infectious person. Patient denies travel to an Ebola-affected area in the 21 days           

      before illness onset. Initial Sepsis Screen: Does the patient meet any 2 criteria? No.      

      Patient's initial sepsis screen is negative. Does the patient have a suspected source       

      of infection? No. Patient's initial sepsis screen is negative. Risk Assessment: Do you      

      want to hurt yourself or someone else? Patient reports no desire to harm self or            

      others. Onset of symptoms was 2022.                                               

13:52 Method Of Arrival: Ambulatory                                                           [facility] 

13:52 Acuity: ELEANOR 3                                                                           jh6 

                                                                                                  

Triage Assessment:                                                                                

13:56 General: Appears in no apparent distress. Behavior is calm, cooperative. Pain: Pain     [facility] 

      currently is 3 out of 10 on a pain scale. Quality of pain is described as aching, Pain      

      began 1 day ago. Is continuous, Aggravated by exercise, increased activity, weight          

      bearing.                                                                                    

                                                                                                  

Historical:                                                                                       

- Allergies:                                                                                      

13:54 Codeine;                                                                                6 

13:54 Iodinated Contrast Media - IV Dye;                                                      6 

13:54 Iodine;                                                                                 6 

- Home Meds:                                                                                      

13:54 Bumetanide Oral 2 times per day [Active]; Eliquis 5 mg Oral tab 1 tab 2 times per day   6 

      [Active]; flecainide 100 mg Oral tab 1 tab every 12 hours [Active]; Klor-Con 10 10 mEq      

      Oral TbER 1 tab 2 times per day [Active]; metoprolol tartrate 50 mg Oral tab 1 tab 2        

      times per day [Active];                                                                     

- PMHx:                                                                                           

13:54 Atrial Fib; CPAP; Hypertension; Polycythemia; Sleep Apnea;                              jh6 

                                                                                                  

- Immunization history:: Adult Immunizations up to date, Client reports receiving the             

  2nd dose of the Covid vaccine.                                                                  

- Social history:: Smoking status: Patient denies any tobacco usage or history of.                

                                                                                                  

                                                                                                  

Screenin:54 Abuse screen: Denies threats or abuse.                                                  [facility] 

13:54 Nutritional screening: No deficits noted. Tuberculosis screening: No symptoms or risk   [facility] 

      factors identified. Fall Risk None identified. IV access (20 points).                       

                                                                                                  

Assessment:                                                                                       

13:58 General: Appears in no apparent distress. comfortable, Behavior is calm, cooperative.   [facility] 

13:58 Respiratory: Reports shortness of breath at rest generalized body aches.                [facility] 

15:00 Reassessment: No changes from previously documented assessment. Patient and/or family   bp  

      updated on plan of care and expected duration. Pain level reassessed.                       

16:00 Reassessment: No changes from previously documented assessment. Patient and/or family   bp  

      updated on plan of care and expected duration. Pain level reassessed. ADMIT INITIATED.      

17:00 Reassessment: No changes from previously documented assessment. Patient and/or family   bp  

      updated on plan of care and expected duration. Pain level reassessed.                       

18:00 Reassessment: No changes from previously documented assessment. Patient and/or family   bp  

      updated on plan of care and expected duration. Pain level reassessed. ADMIT IN PROCESS.     

18:35 Reassessment: No changes from previously documented assessment. pt sitting up at        [facility] 

      bedside eating sandwich. attempted to call report and asked to wait until after shift       

      change. General: Appears in no apparent distress. comfortable, Behavior is calm,            

      cooperative.                                                                                

                                                                                                  

Vital Signs:                                                                                      

13:52  / 88; Pulse 50; Resp 20; Temp 98.1(T); Pulse Ox 99% ; Weight 138.35 kg; Height 6 [facility] 

      ft. 2 in. (187.96 cm); Pain 3/10;                                                           

14:39  / 84; Pulse 107; Resp 20; Pulse Ox 100% ; Pain 3/10;                             6 

15:30  / 95; Pulse 105; Resp 23; Pulse Ox 100% ;                                        bp  

16:30  / 98; Pulse 97; Resp 14; Pulse Ox 100% ;                                         bp  

17:22  / 90; Pulse 101; Resp 23; Pulse Ox 98% ;                                         bp  

13:52 Body Mass Index 39.16 (138.35 kg, 187.96 cm)                                            [facility] 

                                                                                                  

Vitals:                                                                                           

14:39 Cardiac Rhythm Assessment Irregular.                                                    [facility] 

                                                                                                  

ED Course:                                                                                        

13:34 Patient arrived in ED.                                                                  as  

13:46 Geo Johnson MD is Attending Physician.                                             pam 

13:46 Gonzalo Miranda, SANJU is Primary Nurse.                                                    bp  

13:54 Triage completed.                                                                       [facility] 

13:54 Arm band placed on left wrist.                                                          [facility] 

13:54 Bed in low position. Call light in reach. Side rails up X 1.                            [facility] 

14:26 EKG done, by ED staff, reviewed by Geo Johnson MD.                                   [facility] 

15:07 XRAY Chest (1 view) In Process Unspecified.                                             [facility]

15:10 Tacos Liu MD is Hospitalizing Provider.                                           pam 

16:29 SARS-COV-2 RT PCR (Document "Date of Onset" if Symptomatic) Sent.                       bp  

19:13 No provider procedures requiring assistance completed. Patient admitted, IV remains in  bp  

      place.                                                                                      

                                                                                                  

Administered Medications:                                                                         

15:08 Discontinued: NS 0.9% 1000 ml IV at 75 ml/hr continuous                                 pam 

14:52 Drug: NS 0.9% 1000 ml Route: IV; Rate: 75 ml/hr; Site: left antecubital;                [facility] 

15:33 Drug: Eliquis (apixaban) 5 mg Route: PO;                                                [facility] 

16:29 Follow up: Response: No adverse reaction                                                bp  

15:34 Drug: Lasix (furosemide) 40 mg Route: IVP; Site: left antecubital;                      6 

16:29 Follow up: Response: No adverse reaction                                                bp  

15:34 Drug: Aspirin 81 mg Route: PO;                                                          6 

16:29 Follow up: Response: No adverse reaction                                                bp  

16:55 Drug: Ibuprofen 800 mg Route: PO;                                                       bp  

17:06 Follow up: Response: No adverse reaction                                                bp  

17:06 Not Given (Other Intervention Used): morphine 2 mg IVP once; (PAIN>8) RASS on ADMN:     bp  

      Combtv4, Very Agttd3, Agttd2, Rstlss1, AlertClm0, Drwsy-1, LtSdtn-2, ModSdtn-3,             

      DpSdtn-4, UnArsble-5 x2                                                                     

17:06 Not Given (Other Intervention Used): Zofran (Ondansetron) 4 mg IVP once; over 2 minutes bp  

                                                                                                  

                                                                                                  

Outcome:                                                                                          

15:12 Decision to Hospitalize by Provider.                                                    pam 

19:12 Admitted to Med/surg accompanied by tech, via wheelchair, room 219, with chart, Report  bp  

      called to  [facility]                                                                        

19:12 Condition: stable                                                                           

19:12 Instructed on the need for admit.                                                           

19:22 Patient left the ED.                                                                    sm5 

                                                                                                  

Signatures:                                                                                       

Dispatcher [employer]                           Geo Buenrostro MD MD cha Martinez, Amelia as Peltier, Brian, RN                      RN   bp                                                   

Becky Navarrete RN                   RN   6                                                  

[place] [id number]                                                  

Marychuy Pickens, RN                        RN   sm5                                                  

                                                                                                  

**************************************************************************************************

## 2022-03-04 NOTE — RAD REPORT
EXAM DESCRIPTION:  Flaco Single View3/4/2022 3:07 pm

 

CLINICAL HISTORY:  Cough

 

COMPARISON:  2021

 

FINDINGS:   The lungs appear clear of acute infiltrate. Upper lobe vessels are prominent indicative o
f pulmonary venous hypertension

 

The heart is mildly to moderately enlarged

## 2022-03-04 NOTE — P.HP
Certification for Inpatient


Patient admitted to: Inpatient


With expected LOS: <2 Midnights


Patient will require the following post-hospital care: None


Practitioner: I am a practitioner with admitting privileges, knowledge of 

patient current condition, hospital course, and medical plan of care.


Services: Services provided to patient in accordance with Admission requirements

found in Title 42 Section 412.3 of the Code of Federal Regulations





Patient History


Date of Service: 22


Reason for admission: CHF exacerbation 


History of Present Illness: 


Mr. Douglas is a 60 yo M with CHF, chronic atrial fibrillation on eliquis, HTN, ARMOND

and secondary polycythemia who presents with two days of increased shortness of 

breath, lower leg edema and wheezing. He reports malaise, weakness as well. 

Denies PND and orthopnea. He received IV lasix, eliquis, and aspirin in the ED. 

Troponin 64.2. Denies chest pain. BNP 1188. Last CHF exacerbation in December 

with improvement of symptoms after IV diuresis.





CXR FINDINGS:   The lungs appear clear of acute infiltrate. Upper lobe vessels 

are prominent indicative of pulmonary venous hypertension


 


The heart is mildly to moderately enlarged


Allergies





iodine Allergy (Severe, Verified 10/26/20 20:29)


   Anaphylaxis


codeine Allergy (Intermediate, Verified 10/26/20 20:29)


   Itching





Home Medications: 








Apixaban [Eliquis] 1 tab PO BID 21 


Bumetanide 1 tab PO BID 21 


Flecainide [Tambocor*] 1 tab PO Q12H 21 


Metoprolol Tartrate 1 tab PO BID 21 


Potassium Oral Tab [Klor-Con 10 mEq Tab*] 1 tab PO BID 21 








- Past Medical/Surgical History


Diabetic: No


-: HTN


-: Hyperlipidemia


-: Atrial fibrillation, on chronic anti coagulation therapy


-: CAD, Previous stent


-: Obesity


-: Obstructive sleep apnea


-: Tobacco abuse


-: NIDDM


-: Cardiac stent 


-: Bilateral knee arthroscopic surgery


-: Appendectomy


-: Hernia repair


-: hernia repair


Psychosocial/ Personal History: patient is 





- Family History


  ** Father


-: Cancer


Notes: , colon rectal ca





  ** Mother


-: Heart disease, Hypertension


Notes: 





  ** Brother


-: Diabetes





- Social History


Smoking Status: Current some day smoker


Alcohol use: No


CD- Drugs: No


Caffeine use: No


Place of Residence: Home





Review of Systems


General: Weakness, Malaise


Eyes: Unremarkable


ENT: Unremarkable


Respiratory: Shortness of Breath, SOB with Excertion, Wheezing


Cardiovascular: Edema


Gastrointestinal: Unremarkable


Genitourinary: Unremarkable


Musculoskeletal: Unremarkable


Integumentary: Unremarkable


Neurological: Unremarkable


Lymphatics: Unremarkable





Physical Examination





- Physical Exam


General: Alert, In no apparent distress


HEENT: Atraumatic, PERRLA, Mucous membr. moist/pink, EOMI, Sclerae nonicteric


Neck: Supple, 2+ carotid pulse no bruit, No LAD, Without JVD or thyroid 

abnormality


Respiratory: Diminished


Cardiovascular: Normal S1 S2, Edema, Irregular heart rate/rhythm


Gastrointestinal: Normal bowel sounds, No tenderness


Musculoskeletal: No tenderness


Integumentary: No rashes


Neurological: Normal speech, Normal strength at 5/5 x4 extr, Normal tone, Normal

affect


Lymphatics: No axilla or inguinal lymphadenopathy





- Studies


Laboratory Data (last 24 hrs)





22 14:10: PT 20.3 H, INR 1.76


22 14:10: WBC 7.10, Hgb 13.9, Hct 44.3, Plt Count 148 L


22 14:10: Sodium 138, Potassium 3.9, BUN 16, Creatinine 1.42 H, Glucose 

92, Magnesium 2.1, Total Bilirubin 1.5 H, AST 19, ALT 29, Alkaline Phosphatase 

59








Assessment and Plan





- Problems (Diagnosis)


(1) Afib


Current Visit: Yes   Status: Chronic   


Qualifiers: 


   Atrial fibrillation type: unspecified   Qualified Code(s): I48.91 - 

Unspecified atrial fibrillation   





(2) CKD (chronic kidney disease)


Current Visit: Yes   Status: Chronic   


Qualifiers: 


   Chronic kidney disease stage 3 subtype: stage 3a (GFR 45-59) 





(3) Acute exacerbation of CHF (congestive heart failure)


Current Visit: No   Status: Acute   


Qualifiers: 


   Heart failure type: unspecified   Qualified Code(s): I50.9 - Heart failure, 

unspecified   





(4) CAD (coronary artery disease)


Current Visit: No   Status: Chronic   


Qualifiers: 


   Coronary Disease-Associated Artery/Lesion type: native artery   Native vs. 

transplanted heart: native heart   Associated angina: without angina   Qualified

Code(s): I25.10 - Atherosclerotic heart disease of native coronary artery 

without angina pectoris   





(5) HTN (hypertension)


Current Visit: No   Status: Chronic   


Qualifiers: 


   Hypertension type: primary hypertension   Qualified Code(s): I10 - Essential 

(primary) hypertension   





(6) ARMOND (obstructive sleep apnea)


Current Visit: No   Status: Chronic   





(7) Polycythemia


Current Visit: No   Status: Chronic   





(8) Troponin level elevated


Current Visit: Yes   Status: Acute   





- Plan


cardiology consulted


on tele, trend troponins, repeat EKG


ECHO pending


IV lasix, low sodium diet, daily weights, fluid restrict


reconcile and continue home medications


CPAP at bedtime 


Discharge Plan: Home


Plan to discharge in: 48 Hours





- Advance Directives


Does patient have a Living Will: No


Does patient have a Durable POA for Healthcare: No





- Code Status/Comfort Care


Code Status Assessed: Yes (full code )


Critical Care: No


Time Spent Managing Pts Care (In Minutes): 70

## 2022-03-05 LAB
ALBUMIN SERPL BCP-MCNC: 3.7 G/DL (ref 3.4–5)
ALP SERPL-CCNC: 59 U/L (ref 45–117)
ALT SERPL W P-5'-P-CCNC: 27 U/L (ref 12–78)
AST SERPL W P-5'-P-CCNC: 22 U/L (ref 15–37)
BUN BLD-MCNC: 21 MG/DL (ref 7–18)
GLUCOSE SERPLBLD-MCNC: 115 MG/DL (ref 74–106)
HCT VFR BLD CALC: 45.4 % (ref 39.6–49)
HDLC SERPL-MCNC: 24 MG/DL (ref 40–60)
LDLC SERPL CALC-MCNC: 86 MG/DL (ref ?–130)
LYMPHOCYTES # SPEC AUTO: 0.4 K/UL (ref 0.7–4.9)
MAGNESIUM SERPL-MCNC: 2 MG/DL (ref 1.8–2.4)
PMV BLD: 8.5 FL (ref 7.6–11.3)
POTASSIUM SERPL-SCNC: 4 MMOL/L (ref 3.5–5.1)
RBC # BLD: 6.17 M/UL (ref 4.33–5.43)

## 2022-03-05 RX ADMIN — FLECAINIDE ACETATE SCH MG: 100 TABLET ORAL at 23:07

## 2022-03-05 RX ADMIN — ACETAMINOPHEN PRN MG: 500 TABLET, FILM COATED ORAL at 17:35

## 2022-03-05 RX ADMIN — APIXABAN SCH MG: 5 TABLET, FILM COATED ORAL at 08:24

## 2022-03-05 RX ADMIN — FLECAINIDE ACETATE SCH MG: 100 TABLET ORAL at 08:26

## 2022-03-05 RX ADMIN — METOPROLOL TARTRATE SCH MG: 50 TABLET, FILM COATED ORAL at 23:06

## 2022-03-05 RX ADMIN — APIXABAN SCH MG: 5 TABLET, FILM COATED ORAL at 23:07

## 2022-03-05 RX ADMIN — Medication SCH ML: at 08:25

## 2022-03-05 RX ADMIN — ACETAMINOPHEN PRN MG: 500 TABLET, FILM COATED ORAL at 08:24

## 2022-03-05 RX ADMIN — ACETAMINOPHEN PRN MG: 500 TABLET, FILM COATED ORAL at 23:12

## 2022-03-05 RX ADMIN — Medication SCH ML: at 23:07

## 2022-03-05 RX ADMIN — ALBUMIN (HUMAN) SCH MG: 5 SOLUTION INTRAVENOUS at 08:25

## 2022-03-05 RX ADMIN — ALBUMIN (HUMAN) SCH MG: 5 SOLUTION INTRAVENOUS at 15:55

## 2022-03-05 RX ADMIN — ACETAMINOPHEN PRN MG: 500 TABLET, FILM COATED ORAL at 13:59

## 2022-03-05 RX ADMIN — METOPROLOL TARTRATE SCH MG: 50 TABLET, FILM COATED ORAL at 08:24

## 2022-03-05 NOTE — EKG
Test Date:    2022-03-05               Test Time:    03:15:03

Technician:   RT                                     

                                                     

MEASUREMENT RESULTS:                                       

Intervals:                                           

Rate:         83                                     

WY:                                                  

QRSD:         114                                    

QT:           402                                    

QTc:          472                                    

Axis:                                                

P:                                                   

WY:                                                  

QRS:          77                                     

T:            91                                     

                                                     

INTERPRETIVE STATEMENTS:                                       

                                                     

Atrial fibrillation

Possible Inferior infarct, age undetermined

Abnormal ECG

Compared to ECG 12/06/2021 09:32:16

Atrial flutter no longer present

Ventricular premature complex(es) no longer present

Myocardial infarct finding still present



Electronically Signed On 03-05-22 12:44:42 CST by Kamlesh Driscoll

## 2022-03-05 NOTE — P.PN
Subjective


Date of Service: 03/05/22


Chief Complaint: CHF exacerbation 


Subjective: Improving (Patient is hypoglycemic this morning. No history of DM.  

Respiratory status has improved. Doing well on room air)





Physical Examination





- Vital Signs


Temperature: 99.9 F


Blood Pressure: 131/67


Pulse: 80


Respirations: 18


Pulse Ox (%): 96





- Physical Exam


General: In no apparent distress, Cooperative, Obese


HEENT: Atraumatic, Normocephalic


Respiratory: Normal air movement


Cardiovascular: Regular rate/rhythm, Normal S1 S2, No murmurs, Edema


Musculoskeletal: Other (1+ pitting edema)


Neurological: Normal speech, Normal affect





- Studies


Laboratory Data (last 24 hrs)





03/04/22 14:10: PT 20.3 H, INR 1.76


03/04/22 14:10: WBC 7.10, Hgb 13.9, Hct 44.3, Plt Count 148 L


03/04/22 14:10: Sodium 138, Potassium 3.9, BUN 16, Creatinine 1.42 H, Glucose 

92, Magnesium 2.1, Total Bilirubin 1.5 H, AST 19, ALT 29, Alkaline Phosphatase 

59








Assessment And Plan





- Current Problems (Diagnosis)


(1) Hypoglycemia


Current Visit: Yes   Status: Acute   





(2) Afib


Current Visit: Yes   Status: Chronic   


Qualifiers: 


   Atrial fibrillation type: unspecified   Qualified Code(s): I48.91 - 

Unspecified atrial fibrillation   





(3) CKD (chronic kidney disease)


Current Visit: Yes   Status: Chronic   


Qualifiers: 


   Chronic kidney disease stage 3 subtype: stage 3a (GFR 45-59) 





(4) Acute exacerbation of CHF (congestive heart failure)


Current Visit: No   Status: Acute   


Qualifiers: 


   Heart failure type: unspecified   Qualified Code(s): I50.9 - Heart failure, 

unspecified   





(5) Atrial flutter


Current Visit: No   Status: Chronic   


Qualifiers: 


   Atrial flutter type: unspecified   Qualified Code(s): I48.92 - Unspecified 

atrial flutter   





(6) CAD (coronary artery disease)


Current Visit: No   Status: Chronic   


Qualifiers: 


   Coronary Disease-Associated Artery/Lesion type: native artery   Native vs. 

transplanted heart: native heart   Associated angina: without angina   Qualified

Code(s): I25.10 - Atherosclerotic heart disease of native coronary artery 

without angina pectoris   





(7) HTN (hypertension)


Current Visit: No   Status: Chronic   


Qualifiers: 


   Hypertension type: primary hypertension   Qualified Code(s): I10 - Essential 

(primary) hypertension   





(8) ARMOND (obstructive sleep apnea)


Current Visit: No   Status: Chronic   





(9) Polycythemia


Current Visit: No   Status: Chronic   


Physician Review Additional Text: 





Assessment


Patient is a 61 year old  male with obesity, ARMOND, CHF and polycythemia 

vera. He is admitted with possible CHF exacerbation after he presented with 

shortness of breath. He did well on IV lasix and is currently on room air.  





Acute respiratory distress


Acute on chronic systolic CHF


ARMOND


Pulmonary HTN


Atrial fibrillation


HTN


Hypoglycemia


Polycythemia Vera





PLAN:


CXR is negative


His sx are of of multifactorial etiology: ARMOND, PH and right heart failure


Continue diuresis


CPAP at night


Follow up TTE


Continue beta blockers, fleicanide and eliquis for afib


Avoid nephrotoxins due to CKD


Monitor for hypoglycemia.  FS ACHS. Please notify MD if abnormal results


He will need at least 1-2 additional days





03/05/22 11:10

## 2022-03-05 NOTE — CON
Date of Consultation:  03/05/2022



Reason For Consultation:  Congestive heart failure.



History Of Present Illness:  Mr. Douglas is 61.  He is known to have severe pulmonary hypertension, norm
al ejection fraction by echocardiography in 2021, has chronic atrial fibrillation, chronic sleep apne
a.  He is supposed to be on CPAP and BiPAP.  Morbid obesity at 305.  He has polycythemia vera.  He co
mes in intermittently with congestive heart failure.  Denied any chest pain, nausea, vomiting, diapho
resis.  Denied any palpitation or syncope.  Denied any fever or chills.  He just had PND, orthopnea, 
and pedal edema.  He was already better on IV Lasix.  He is requesting to have phlebotomy.



Past Medical History:  As stated above.



Allergies:  INCLUDE IODINE AND CODEINE.



Review of Systems:

Negative.



Social History:  Negative.



Family History:  Negative.



Medications:  At home include Eliquis, flecainide, metoprolol, and Lasix.



Physical Examination:

Vital Signs:  He weighed 305 pounds.  He was in AFib at a rate of 78. 

HEENT:  Negative. 

Neck:  Supple.  No bruit. 

Chest:  Revealed rales at both bases. 

Cardiac:  Revealed atrial fibrillation. 

Abdomen:  Obese. 

Extremities:  Revealed 1+ edema.



Diagnostic Data:  EKG showed LVH.  Creatinine is 1.56.  Troponin is 64.  BNP is 1464.



Impression And Plan:  

1.Chronic atrial fibrillation, on Eliquis and metoprolol, rate controlled.

2.Sleep apnea, on CPAP.

3.Polycythemia vera, may need to be phlebotomized.

4.Hypertension, well controlled.

5.Obesity.

6.Renal insufficiency.

7.Elevated troponin and BNP secondary to congestive heart failure and severe pulmonary hypertension.
  I think patient needs to continue his medical regimen, double his Lasix at home.  I think we should
 put him on a calcium channel blocker and maybe Norvasc or Cardizem that may help with severe pulmona
ry hypertension.  I think he should have pulmonary consultation and Pulmonary followup and possibly H
ematology followup for his polycythemia.





NB/MODL

DD:  03/05/2022 16:58:30Voice ID:  [id number]

DT:  03/05/2022 21:33:58Report ID:  [id number]

## 2022-03-06 LAB
BUN BLD-MCNC: 33 MG/DL (ref 7–18)
COHGB MFR BLDA: 1.2 % (ref 0–1.5)
GLUCOSE SERPLBLD-MCNC: 100 MG/DL (ref 74–106)
MAGNESIUM SERPL-MCNC: 2.2 MG/DL (ref 1.8–2.4)
OXYHGB MFR BLDA: 92.4 % (ref 94–97)
POTASSIUM SERPL-SCNC: 4 MMOL/L (ref 3.5–5.1)
SAO2 % BLDA: 94.8 % (ref 92–98.5)

## 2022-03-06 RX ADMIN — ALBUMIN (HUMAN) SCH MG: 5 SOLUTION INTRAVENOUS at 09:31

## 2022-03-06 RX ADMIN — APIXABAN SCH MG: 5 TABLET, FILM COATED ORAL at 22:36

## 2022-03-06 RX ADMIN — FLECAINIDE ACETATE SCH MG: 100 TABLET ORAL at 21:00

## 2022-03-06 RX ADMIN — FLECAINIDE ACETATE SCH MG: 100 TABLET ORAL at 08:49

## 2022-03-06 RX ADMIN — Medication SCH ML: at 08:49

## 2022-03-06 RX ADMIN — METOPROLOL TARTRATE SCH MG: 50 TABLET, FILM COATED ORAL at 22:37

## 2022-03-06 RX ADMIN — Medication SCH ML: at 22:39

## 2022-03-06 RX ADMIN — APIXABAN SCH MG: 5 TABLET, FILM COATED ORAL at 08:49

## 2022-03-06 RX ADMIN — ACETAMINOPHEN PRN MG: 500 TABLET, FILM COATED ORAL at 10:29

## 2022-03-06 RX ADMIN — ALBUMIN (HUMAN) SCH MG: 5 SOLUTION INTRAVENOUS at 16:48

## 2022-03-06 RX ADMIN — BISACODYL PRN MG: 5 TABLET, COATED ORAL at 22:42

## 2022-03-06 RX ADMIN — METOPROLOL TARTRATE SCH: 50 TABLET, FILM COATED ORAL at 09:00

## 2022-03-06 NOTE — P.PN
Subjective


Date of Service: 03/06/22


Chief Complaint: CHF exacerbation 


Subjective: Improving





Physical Examination





- Vital Signs


Temperature: 97 F


Blood Pressure: 114/80


Pulse: 58


Respirations: 20


Pulse Ox (%): 100





- Physical Exam


General: Other


HEENT: Atraumatic, Normocephalic


Respiratory: Diminished, Other (Breathing unlaboured on BIPAP)


Cardiovascular: Regular rate/rhythm, Normal S1 S2, Edema


Musculoskeletal: No clubbing, No swelling


Neurological: Normal speech, Normal affect





Assessment And Plan





- Current Problems (Diagnosis)


(1) Hypoglycemia


Current Visit: Yes   Status: Acute   





(2) Afib


Current Visit: Yes   Status: Chronic   


Qualifiers: 


   Atrial fibrillation type: unspecified   Qualified Code(s): I48.91 - 

Unspecified atrial fibrillation   





(3) CKD (chronic kidney disease)


Current Visit: Yes   Status: Chronic   


Qualifiers: 


   Chronic kidney disease stage 3 subtype: stage 3a (GFR 45-59) 





(4) Acute exacerbation of CHF (congestive heart failure)


Current Visit: No   Status: Acute   


Qualifiers: 


   Heart failure type: unspecified   Qualified Code(s): I50.9 - Heart failure, 

unspecified   





(5) Atrial flutter


Current Visit: No   Status: Chronic   


Qualifiers: 


   Atrial flutter type: unspecified   Qualified Code(s): I48.92 - Unspecified 

atrial flutter   





(6) CAD (coronary artery disease)


Current Visit: No   Status: Chronic   


Qualifiers: 


   Coronary Disease-Associated Artery/Lesion type: native artery   Native vs. 

transplanted heart: native heart   Associated angina: without angina   Qualified

Code(s): I25.10 - Atherosclerotic heart disease of native coronary artery 

without angina pectoris   





(7) HTN (hypertension)


Current Visit: No   Status: Chronic   


Qualifiers: 


   Hypertension type: primary hypertension   Qualified Code(s): I10 - Essential 

(primary) hypertension   





(8) ARMOND (obstructive sleep apnea)


Current Visit: No   Status: Chronic   





(9) Polycythemia


Current Visit: No   Status: Chronic   


Physician Review Additional Text: 





Assessment


Patient is a 61 year old  male with obesity, ARMOND, CHF and polycythemia 

vera. He is admitted with possible CHF exacerbation after he presented with 

shortness of breath. He did well on IV lasix.  





Acute respiratory distress


Acute on chronic systolic CHF


ARMOND


Pulmonary HTN


Atrial fibrillation


HTN


Hypoglycemia


Polycythemia Vera





PLAN:


His sx are of of multi-factorial etiology: ARMOND, PH and right heart failure


Continue diuresis


CPAP at night


Follow up TTE


Continue anticoagulation for possible CTE


Cardiology recommends adding calcium channel blockers for pulmonary HTN.  


Pulmonary was consulted today


Continue beta blockers, fleicanide and eliquis for afib


Avoid nephrotoxins due to CKD


Patient has polycythemia vera and requiring phlebotomy when Hct > 47


He will need a few more days of diuresis








03/06/22 12:31





03/06/22 12:33

## 2022-03-06 NOTE — P.CNS
Date of Consult: 22


Chief Complaint: CHF exacerbation 


History of Present Illness: 


Patient is 61 years of age history of CHF chronic A. fib sleep apnea early side 

team he admitted with worsening shortness of breath lower extremity edema 

wheezing is quit smoking 6 months ago





Allergies





iodine Allergy (Severe, Verified 10/26/20 20:29)


   Anaphylaxis


codeine Allergy (Intermediate, Verified 10/26/20 20:29)


   Itching





Home Medications: 








Apixaban [Eliquis] 1 tab PO BID 21 


Bumetanide 1 tab PO BID 21 


Flecainide [Tambocor*] 1 tab PO Q12H 21 


Metoprolol Tartrate 1 tab PO BID 21 


Potassium Oral Tab [Klor-Con 10 mEq Tab*] 1 tab PO BID 21 








- Past Medical/Surgical History


Diabetic: No


-: HTN


-: Hyperlipidemia


-: Atrial fibrillation, on chronic anti coagulation therapy


-: CAD, Previous stent


-: Obesity


-: Obstructive sleep apnea


-: Tobacco abuse


-: NIDDM


-: Cardiac stent 


-: Bilateral knee arthroscopic surgery


-: Appendectomy


-: Hernia repair


-: hernia repair


Psychosocial/ Personal History: patient is 





- Family History


  ** Father


Medical History: Cancer


Notes: , colon rectal ca





  ** Mother


Medical History: Heart disease, Hypertension


Notes: 





  ** Brother


Medical History: Diabetes





- Social History


Smoking Status: Current every day smoker


Alcohol use: No


CD- Drugs: No


Caffeine use: No


Place of Residence: Home





Review of Systems


Respiratory: Shortness of Breath


Cardiovascular: Edema


Gastrointestinal: Nausea





Physical Examination














Temp Pulse Resp BP Pulse Ox


 


 97 F   58   20   114/80   100 


 


 22 08:00  22 09:00  22 08:00  22 09:00  22 08:00








General: Alert, Oriented x3


HEENT: Atraumatic


Respiratory: Clear to auscultation bilaterally, Expiratory wheezes


Cardiovascular: Regular rate/rhythm, Edema





- Problems


(1) Acute exacerbation of CHF (congestive heart failure)


Current Visit: No   Status: Acute   


Plan: 


Patient is 61 years of age admitted with acute on chronic congestive heart 

failure have underlying obstructive airways disease metabolic syndrome sleep 

apnea but smoking 6 months ago he may have underlying obstructive airways 

disease chronic renal insufficiency vital signs stable patient is on BiPAP mild 

microcytosis vital signs stable have added Dulera he will need outpatient 

pulmonary function testing 


Qualifiers: 


   Heart failure type: unspecified   Qualified Code(s): I50.9 - Heart failure, 

unspecified   





(2) Pulmonary hypertension


Current Visit: Yes   Status: Acute   


Plan: 


Any patient has severe pulmonary hypertension evidence of right ventricular 

dilatation he may also need a VQ scan to rule out chronic thromboembolic disease

although patient is on Eliquis work-up for pulmonary hypertension including PFTs

optimize bronchodilator therapy

## 2022-03-06 NOTE — PN
Date of Progress Note:  03/06/2022



Mr. Douglas is in the hospital for congestive heart failure, most likely secondary to severe pulmonary h
ypertension and diastolic dysfunction.  He has a normal ejection fraction.  He is on appropriate ther
apy with anticoagulants, beta-blockers, and Lasix, but I think he would benefit from use of calcium c
hannel blocker either Norvasc or Cardizem.  I discussed the case with Dr. Zhang.  The patient is he
modynamics stable.  He is in sinus rhythm, afebrile with good oxygen saturation, and I am comfortable
 with him going home and we will see him in the office in the near future.





NB/JAQUELINE

DD:  03/06/2022 18:26:28Voice ID:  [id number]

DT:  03/06/2022 19:28:19Report ID:  [id number]

## 2022-03-07 VITALS — SYSTOLIC BLOOD PRESSURE: 150 MMHG | DIASTOLIC BLOOD PRESSURE: 84 MMHG | TEMPERATURE: 98.2 F

## 2022-03-07 VITALS — OXYGEN SATURATION: 98 %

## 2022-03-07 RX ADMIN — APIXABAN SCH MG: 5 TABLET, FILM COATED ORAL at 09:21

## 2022-03-07 RX ADMIN — ALBUMIN (HUMAN) SCH MG: 5 SOLUTION INTRAVENOUS at 09:21

## 2022-03-07 RX ADMIN — METOPROLOL TARTRATE SCH MG: 50 TABLET, FILM COATED ORAL at 09:21

## 2022-03-07 RX ADMIN — BISACODYL PRN MG: 5 TABLET, COATED ORAL at 09:21

## 2022-03-07 RX ADMIN — FLECAINIDE ACETATE SCH MG: 100 TABLET ORAL at 09:00

## 2022-03-07 RX ADMIN — Medication SCH ML: at 09:00

## 2022-03-07 NOTE — ECHO
HEIGHT: 6 ft 2 in   WEIGHT: 305 lb 12 oz   DATE OF STUDY: 03/07/2022   REFER DR: Lobito Green

2-DIMENSIONAL: YES

     M.MODE: YES

 DOPPLER: YES

COLOR FLOW: YES



                    TDS:  NO

PORTABLE: NO

 DEFINITY:  NO

BUBBLE STUDY: NO





DIAGNOSIS:  VOLUME OVERLOAD



CARDIAC HISTORY:  

CATHERIZATION: YES

SURGERY: NO

PROSTHETIC VALVE: NO

PACEMAKER: NO





MEASUREMENTS (cm)

    DIASTOLIC (NORMALS)                 SYSTOLIC (NORMALS)

IVSd                 1.4 (0.6-1.2)                    LA Diam 3.9 (1.9-4.0)     LVEF       
  55%  

LVIDd               4.4 (3.5-5.7)                        LVIDs      3.1 (2.0-3.5)     %FS  
        28%

LVPWd             1.5 (0.6-1.2)

Ao Diam           3.2 (2.0-3.7)



2 DIMENSIONAL ASSESSMENT:

RIGHT ATRIUM:                   

LEFT ATRIUM:       ENLARGED



RIGHT VENTRICLE:            

LEFT VENTRICLE: NORMAL



TRICUSPID VALVE:             

MITRAL VALVE:     



PULMONIC VALVE:             

AORTIC VALVE:     MILD SCLEROSIS



PERICARDIAL EFFUSION: 

AORTIC ROOT:      NORMAL





LEFT VENTRICULAR WALL MOTION:     UNABLE TO EVALUATE DUE TO POOR WINDOWS.



DOPPLER/COLOR FLOW:     MILD MITRAL REGURGITATION. 



COMMENTS:      POOR WINDOWS. LEFT VENTRICULAR EJECTION FRACTION APPEARS NORMAL, RECOMMEND 
CONTRAST ECHO TO BETTER EVALUATE. MILD MITRAL REGURGITATION BY DOPPLER.



TECHNOLOGIST:   FELIPE WHALEY

## 2022-03-07 NOTE — RAD REPORT
EXAM DESCRIPTION:  RAD - Abdomen  W Erect - 3/7/2022 12:45 pm

 

CLINICAL HISTORY:  SBO

Pain

 

COMPARISON:  No comparisons

 

FINDINGS:  The bowel gas pattern is non-obstructive. No evidence of free air or pneumatosis. No suspi
cious calcifications.

 

No significant bony findings.

 

 

IMPRESSION:  Negative examination.

## 2023-01-03 ENCOUNTER — HOSPITAL ENCOUNTER (EMERGENCY)
Dept: HOSPITAL 97 - ER | Age: 63
Discharge: ANOTHER HOSPITAL | End: 2023-01-03
Payer: COMMERCIAL

## 2023-01-03 VITALS — OXYGEN SATURATION: 93 % | SYSTOLIC BLOOD PRESSURE: 119 MMHG | TEMPERATURE: 97 F | DIASTOLIC BLOOD PRESSURE: 81 MMHG

## 2023-01-03 DIAGNOSIS — Z88.5: ICD-10-CM

## 2023-01-03 DIAGNOSIS — Z95.818: ICD-10-CM

## 2023-01-03 DIAGNOSIS — I10: ICD-10-CM

## 2023-01-03 DIAGNOSIS — I47.20: ICD-10-CM

## 2023-01-03 DIAGNOSIS — Z20.822: ICD-10-CM

## 2023-01-03 DIAGNOSIS — Z91.041: ICD-10-CM

## 2023-01-03 DIAGNOSIS — J96.00: Primary | ICD-10-CM

## 2023-01-03 DIAGNOSIS — E83.42: ICD-10-CM

## 2023-01-03 LAB
BUN BLD-MCNC: 10 MG/DL (ref 7–18)
COHGB MFR BLDA: 0.8 % (ref 0–1.5)
GLUCOSE SERPLBLD-MCNC: 167 MG/DL (ref 74–106)
HCT VFR BLD CALC: 57.1 % (ref 39.6–49)
LYMPHOCYTES # SPEC AUTO: 2.2 K/UL (ref 0.7–4.9)
MAGNESIUM SERPL-MCNC: 1.1 MG/DL (ref 1.6–2.4)
MCV RBC: 89.5 FL (ref 80–100)
NT-PROBNP SERPL-MCNC: 1435 PG/ML (ref ?–125)
OXYHGB MFR BLDA: 83.7 % (ref 94–97)
PMV BLD: 9.1 FL (ref 7.6–11.3)
POTASSIUM SERPL-SCNC: 4.1 MMOL/L (ref 3.5–5.1)
RBC # BLD: 6.39 M/UL (ref 4.33–5.43)
SAO2 % BLDA: 85.4 % (ref 92–98.5)
TROPONIN I SERPL HS-MCNC: 67.2 PG/ML (ref ?–58.9)

## 2023-01-03 PROCEDURE — 84484 ASSAY OF TROPONIN QUANT: CPT

## 2023-01-03 PROCEDURE — 80048 BASIC METABOLIC PNL TOTAL CA: CPT

## 2023-01-03 PROCEDURE — 71045 X-RAY EXAM CHEST 1 VIEW: CPT

## 2023-01-03 PROCEDURE — 92960 CARDIOVERSION ELECTRIC EXT: CPT

## 2023-01-03 PROCEDURE — 36415 COLL VENOUS BLD VENIPUNCTURE: CPT

## 2023-01-03 PROCEDURE — 83880 ASSAY OF NATRIURETIC PEPTIDE: CPT

## 2023-01-03 PROCEDURE — 82805 BLOOD GASES W/O2 SATURATION: CPT

## 2023-01-03 PROCEDURE — 85025 COMPLETE CBC W/AUTO DIFF WBC: CPT

## 2023-01-03 PROCEDURE — 99292 CRITICAL CARE ADDL 30 MIN: CPT

## 2023-01-03 PROCEDURE — 83735 ASSAY OF MAGNESIUM: CPT

## 2023-01-03 PROCEDURE — 31500 INSERT EMERGENCY AIRWAY: CPT

## 2023-01-03 PROCEDURE — 94002 VENT MGMT INPAT INIT DAY: CPT

## 2023-01-03 PROCEDURE — 93005 ELECTROCARDIOGRAM TRACING: CPT

## 2023-01-03 PROCEDURE — 87811 SARS-COV-2 COVID19 W/OPTIC: CPT

## 2023-01-03 PROCEDURE — 99291 CRITICAL CARE FIRST HOUR: CPT

## 2023-01-03 NOTE — RAD REPORT
EXAM DESCRIPTION:  RAD - Chest Single View - 1/3/2023 3:41 pm

 

CLINICAL HISTORY:  Chest pain

 

COMPARISON:  Chest Single View dated 3/4/2022; Chest Single View dated 12/6/2021; Chest Single View d
ated 8/10/2021; Chest Single View dated 12/16/2020

 

FINDINGS:  Lines: Endotracheal tube at the aortic arch. Enteric tube below the diaphragm.

Lungs: Widespread pulmonary opacities with central vascular engorgement.

Pleural: No significant pleural effusions or pneumothorax.

Cardiac: Cardiomegaly.

Mediastinum: Within normal limits.

Bones: No acute fractures.

Other: None

 

IMPRESSION:  Findings most likely representing severe pulmonary edema. Endotracheal tube in satisfact
ory position at the aortic arch. Enteric tube below the diaphragm.

## 2023-01-03 NOTE — ER
Nurse's Notes                                                                                     

 [facility]                                                                 

Name: Naren Douglas                                                                                  

Age: 62 yrs                                                                                       

Sex: Male                                                                                         

: 1960                                                                                   

MRN: [id number]                                                                                   

Arrival Date: 2023                                                                          

Time: 13:54                                                                                       

Account#: [id number]                                                                            

Bed 3                                                                                             

Private MD:                                                                                       

Diagnosis: Ventricular tachycardia;Acute respiratory failure;Hypomagnesemia                       

                                                                                                  

Presentation:                                                                                     

                                                                                             

13:57 Chief complaint: Patient states: SOB started last night. V tach on monitor, 1 shock en  ph  

      route. Amiodarone 150mg then 300mg IV given. IV 20 R upper arm. Coronavirus screen:         

      Vaccine status: Patient reports receiving the 2nd dose of the covid vaccine. Client         

      denies travel out of the [place] in the last 14 days. At this time, the client does not        

      indicate any symptoms associated with coronavirus-19. Ebola Screen: Patient denies          

      travel to an Ebola-affected area in the 21 days before illness onset.                       

13:57 Method Of Arrival: EMS                                                                    

14:15 Initial Sepsis Screen: Does the patient meet any 2 criteria? No. Patient's initial      ll1 

      sepsis screen is negative. Does the patient have a suspected source of infection? No.       

      Patient's initial sepsis screen is negative. Risk Assessment: Do you want to hurt           

      yourself or someone else? Patient reports no desire to harm self or others. Onset of        

      symptoms was 2023.                                                               

14:15 Acuity: ELEANOR 1                                                                           ll1 

                                                                                                  

Triage Assessment:                                                                                

13:56 General: Appears distressed, uncomfortable, ill, Behavior is cooperative, appropriate   ll1 

      for age, restless. Pain: Denies pain. Neuro: Reports weakness. Cardiovascular: Reports      

      fatigue, palpitations. Respiratory: Reports shortness of breath.                            

                                                                                                  

Historical:                                                                                       

- Allergies:                                                                                      

13:56 Codeine;                                                                                ph  

13:56 Iodinated Contrast Media - IV Dye;                                                      ph  

13:56 Iodine;                                                                                 ph  

- PMHx:                                                                                           

13:56 Atrial Fib; CPAP; Hypertension; Polycythemia; Sleep Apnea;                              ph  

- PSHx:                                                                                           

13:56 heart stent ;                                                                       ph  

                                                                                                  

- Immunization history:: Client reports receiving the 2nd dose of the Covid vaccine.              

- Social history:: Smoking status: Patient denies any tobacco usage or history of.                

- Family history:: not pertinent.                                                                 

                                                                                                  

                                                                                                  

Screenin:14 [facility] ED Fall Risk Assessment (Adult) Impaired Gait Yes (1 pt) Mobility Assist       ll1 

      Device Used Yes (1 pt) Score/Fall Risk Level 3 or more points = High Risk Oriented to       

      surroundings, Maintained a safe environment, Educated pt \T\ family on fall prevention,     

      incl call for assistance when getting out of bed, Hourly rounding (assess needs \T\ fall    

      precautionary measures) done. Abuse screen: Denies threats or abuse. Nutritional            

      screening: No deficits noted. Tuberculosis screening: No symptoms or risk factors           

      identified.                                                                                 

                                                                                                  

Assessment:                                                                                       

14:00 Reassessment: No changes from previously documented assessment. Patient and/or family   ll1 

      updated on plan of care and expected duration. Pain level reassessed.                       

14:24 Reassessment: No changes from previously documented assessment. Patient and/or family   ll1 

      updated on plan of care and expected duration. Pain level reassessed.                       

15:20 Reassessment: No changes from previously documented assessment. ET tube 8.0, 26 CM at   ll1 

      teeth. + color change. CXR ordered. B lung sounds per RT.                                   

15:45 Reassessment: No changes from previously documented assessment. ET tube R main stem. ET ll1 

      tube to 24 cm at teeth now. Placement verified by CXR..                                     

15:50 Reassessment: No changes from previously documented assessment. Patient and/or family   ll1 

      updated on plan of care and expected duration. Pain level reassessed.                       

16:21 Reassessment: No changes from previously documented assessment. Patient and/or family   ll1 

      updated on plan of care and expected duration. Pain level reassessed.                       

16:55 Reassessment: No changes from previously documented assessment. report to Kendy DE LOS SANTOS RN.ll1 

17:16 Reassessment: No changes from previously documented assessment. Patient and/or family   ll1 

      updated on plan of care and expected duration. Pain level reassessed. Report given to       

      [employer] personnel.                                                                      

                                                                                                  

Vital Signs:                                                                                      

13:57  / 104; Pulse 132; Resp 40; Pulse Ox 100% ;                                       ph  

14:23  / 105; Pulse 127; Resp 40; Pulse Ox 99% on Non-rebreather mask;                  ll1 

14:38 Weight 129.27 kg;                                                                       ll1 

14:51 Temp 97.0(TE);                                                                          ll1 

14:53  / 117; Pulse 128; Resp 25; Pulse Ox 97% ;                                        ll1 

14:55 Pulse 131; Pulse Ox 96% on BVM;                                                         ll1 

14:56 Pulse 122; Resp 19; Pulse Ox 95% on ETT ambu;                                           ll1 

15:02 BP 97 / 65; Pulse 136; Resp 16; Pulse Ox 94% on ETT ambu;                               ll1 

15:10  / 80; Pulse 105; Resp 16; Pulse Ox 88% ;                                         ll1 

15:18  / 80; Pulse 103; Resp 14; Pulse Ox 88% on 100% FiO2 ETT vent;                    ph  

15:36 Pulse 99; Resp 14; Pulse Ox 86% ;                                                       ll1 

15:50 BP 89 / 67; Pulse 102; Resp 16; Pulse Ox 89% on ETT vent;                               ll1 

15:55  / 91; Pulse 103; Resp 16; Pulse Ox 89% on ETT vent;                              ll1 

16:20  / 86; Pulse 97; Resp 20; Temp 96.5(TE); Pulse Ox 92% on ETT vent;                ll1 

17:17  / 81; Pulse 99; Resp 20; Temp 97.0(TE); Pulse Ox 93% ;                           ll1 

                                                                                                  

ED Course:                                                                                        

13:35 Inserted saline lock: 18 gauge in left antecubital area, using aseptic technique. Blood ll1 

      collected.                                                                                  

13:54 Patient arrived in ED.                                                                  ph  

13:56 Christie Echols, RN is Primary Nurse.                                                    ph  

13:57 Adiel Marvin MD is Attending Physician.                                            [facility] 

14:13 Asuncion Oliveira, RN is Primary Nurse.                                                     ll1 

14:15 Triage completed.                                                                       ll1 

14:15 Patient has correct armband on for positive identification. Bed in low position. Call   ll1 

      light in reach. Side rails up X2. Client placed on continuous cardiac and pulse             

      oximetry monitoring. NIBP monitoring applied. Cardiac monitor on.                           

14:16 initiated transfer to [facility], call was never answered, it rang and then    bd  

      just hung up.                                                                               

14:18 SARS RAPID Sent.                                                                        ll1 

14:20 attempted again, phone rang numerous times then hung up. initiated transfer to [facility].                                                                                        

14:24 No provider procedures requiring assistance completed. Inserted saline lock: 22 gauge   ll1 

      in left forearm, using aseptic technique.                                                   

14:48 pt denied at all [facility] due to being on saturation,per Virginie Yanez.           bd  

15:19 Arm band placed on Patient placed in an exam room.                                      ph  

15:43 XRAY Chest (1 view) In Process Unspecified.                                             EDMS

15:43 Chest Single View XRAY In Process Unspecified.                                          EDMS

16:39 Patient transferred, IV remains in place.                                               ll1 

                                                                                                  

Administered Medications:                                                                         

14:00 Drug: Lidocaine 100 mg Route: IVP; Site: left antecubital;                              rt  

15:14 Follow up: Response: No adverse reaction                                                ll1 

14:53 Drug: Etomidate 15 mg Route: IVP; Site: left antecubital;                               ll1 

15:14 Follow up: Response: No adverse reaction                                                ll1 

14:53 Drug: Magnesium Sulfate 2 grams Route: IVPB; Infused Over: 2 mins; Site: left           ll1 

      antecubital;                                                                                

17:18 Follow up: Response: No adverse reaction; IV Status: Completed infusion; IV Intake:     ll1 

      100ml                                                                                       

14:53 Drug: Calcium Gluconate 1 grams Route: IVPB; Infused Over: 60 mins; Site: left          ll1 

      antecubital;                                                                                

15:23 Follow up: Response: No adverse reaction; IV Status: Completed infusion; IV Intake:     ll1 

      100ml                                                                                       

14:54 Drug: Rocuronium 130 mg Route: IVP; Site: left antecubital;                             ll1 

15:14 Follow up: Response: No adverse reaction                                                ll1 

15:00 Drug: Propofol 5 mcg/kg/min Route: IV; Rate: calculated rate; Site: left forearm;       ll1 

17:19 Follow up: Response: No adverse reaction; IV Status: Infusion continued upon transfer;  ll1 

      IV Intake: 50ml                                                                             

15:00 Drug: Metoprolol 5 mg Route: IVP; Site: left antecubital;                               ll1 

15:02 Drug: Metoprolol 5 mg Route: IVP; Site: left antecubital;                               ll1 

15:59 Drug: Metoprolol 5 mg Route: IVP; Site: left antecubital;                               ll1 

17:18 Follow up: Response: No adverse reaction                                                ll1 

16:17 Drug: Lasix (furosemide) 40 mg Route: IVP; Site: left antecubital;                      ll1 

17:18 Follow up: Response: No adverse reaction                                                ll1 

                                                                                                  

                                                                                                  

Medication:                                                                                       

14:24 VIS not applicable for this client.                                                     ll1 

                                                                                                  

Intake:                                                                                           

15:23 IV: 100ml; Total: 100ml.                                                                ll1 

17:18 IV: 100ml; Total: 200ml.                                                                ll1 

17:19 IV: 50ml; Total: 250ml.                                                                 ll1 

                                                                                                  

Outcome:                                                                                          

16:05 ER care complete, transfer ordered by MD.                                               rt  

16:39 Transferred by helicopter to [facility], Transfer form completed.    [facility] 

16:39 Condition: stable                                                                           

16:39 Instructed on the need for transfer.                                                        

17:19 Patient left the ED.                                                                    [facility] 

                                                                                                  

Signatures:                                                                                       

Dispatcher [employer]                           EDMS                                                 

Verito White Patricia RN                      RN                                                      

Asuncion Oliveira RN                       RN   1                                                  

Becky Mobley, JOAQUÍNP                   FNP  7                                                  

Adiel Marvin MD MD   rt                                                   

                                                                                                  

Corrections: (The following items were deleted from the chart)                                    

15:50 14:24 Reassessment: No changes from previously documented assessment. Patient and/or    ll1 

      family updated on plan of care and expected duration. Pain level reassessed. Patient is     

      alert, oriented x 3, equal unlabored respirations, skin warm/dry/pink. [facility]                  

16:21 16:20  / 86; Pulse 97bpm; Resp 20bpm; Pulse Ox 92% ET / Ventilator; 1           [facility] 

17:17 17:16 Reassessment: No changes from previously documented assessment. Patient and/or    ll1 

      family updated on plan of care and expected duration. Pain level reassessed. [facility]            

17:28 13:57 Chief complaint: Patient states: SOB started last night. V tach on monitor, 1     ll1 

      shock en route. Amiodarone 150mg then 200mg IV given. IV 20 R upper arm ph                  

                                                                                                  

**************************************************************************************************

## 2023-01-03 NOTE — EKG
Test Date:    2023-01-03               Test Time:    13:48:45

Technician:   JOHN                                     

                                                     

MEASUREMENT RESULTS:                                       

Intervals:                                           

Rate:         133                                    

IN:                                                  

QRSD:         146                                    

QT:           432                                    

QTc:          642                                    

Axis:                                                

P:                                                   

IN:                                                  

QRS:          -56                                    

T:            109                                    

                                                     

INTERPRETIVE STATEMENTS:                                       

                                                     

Wide QRS tachycardia

Left axis deviation

Left bundle branch block

Abnormal ECG

Compared to ECG 03/05/2022 03:15:32

Wide-QRS tachycardia now present

Left-axis deviation now present

Left bundle-branch block now present

Atrial fibrillation no longer present

Myocardial infarct finding no longer present



Electronically Signed On 01-03-23 14:30:10 CST by Kuldip Bustillo

## 2023-01-03 NOTE — EDPHYS
Physician Documentation                                                                           

 [facility]                                                                 

Name: Naren Douglas                                                                                  

Age: 62 yrs                                                                                       

Sex: Male                                                                                         

: 1960                                                                                   

MRN: [id number]                                                                                   

Arrival Date: 2023                                                                          

Time: 13:54                                                                                       

Account#: [id number]                                                                            

Bed 3                                                                                             

Private MD:                                                                                       

ED Physician Adiel Marvin                                                                     

HPI:                                                                                              

                                                                                             

14:43 This 62 yrs old Male presents to ER via EMS with complaints of V-tach.                  rt  

14:43 The patient has shortness of breath at rest. Onset: The symptoms/episode began/occurred rt  

      last night. Duration: The symptoms are continuous, and are markedly worse than the          

      original presentation. The patient's shortness of breath has no apparent modifying          

      factors. Associated signs and symptoms: The patient has no apparent associated signs or     

      symptoms. Severity of symptoms: At their worst the symptoms were incapacitating. With       

      history of coronary artery disease, FILIBERTO ballesteros presents to the ED with progressively            

      worsening shortness of breath since last night. Patient was brought in by EMS, found to     

      have V. tach, synchronized cardioversion was attempted, unsuccessful. Patient was given     

      a total of 450 of amiodarone to no relief. Denies chest pain, acute complaints at this      

      time, symptoms are severe in severity, no other aggravating or alleviating factors..        

                                                                                                  

Historical:                                                                                       

- Allergies:                                                                                      

13:56 Codeine;                                                                                ph  

13:56 Iodinated Contrast Media - IV Dye;                                                      ph  

13:56 Iodine;                                                                                 ph  

- PMHx:                                                                                           

13:56 Atrial Fib; CPAP; Hypertension; Polycythemia; Sleep Apnea;                              ph  

- PSHx:                                                                                           

13:56 heart stent ;                                                                       ph  

                                                                                                  

- Immunization history:: Client reports receiving the 2nd dose of the Covid vaccine.              

- Social history:: Smoking status: Patient denies any tobacco usage or history of.                

- Family history:: not pertinent.                                                                 

                                                                                                  

                                                                                                  

ROS:                                                                                              

14:43 Constitutional: Negative for fever, chills, and weight loss, Eyes: Negative for injury, rt  

      pain, redness, and discharge, ENT: Negative for injury, pain, and discharge, Neck:          

      Negative for injury, pain, and swelling, Cardiovascular: Negative for chest pain,           

      palpitations, and edema, Abdomen/GI: Negative for abdominal pain, nausea, vomiting,         

      diarrhea, and constipation, MS/Extremity: Negative for injury and deformity, Skin:          

      Negative for injury, rash, and discoloration, Neuro: Negative for headache, weakness,       

      numbness, tingling, and seizure, Psych: Negative for depression, anxiety, suicide           

      ideation, homicidal ideation, and hallucinations.                                           

14:43 Respiratory: Positive for shortness of breath, Negative for cough.                          

14:43 Skin: Positive for diaphoresis, Negative for discoloration.                                 

                                                                                                  

Exam:                                                                                             

14:43 Head/Face:  Normocephalic, atraumatic. Eyes:  Pupils equal round and reactive to light, rt  

      extra-ocular motions intact.  Lids and lashes normal.  Conjunctiva and sclera are           

      non-icteric and not injected.  Cornea within normal limits.  Periorbital areas with no      

      swelling, redness, or edema. ENT:  Nares patent. No nasal discharge, no septal              

      abnormalities noted.  Tympanic membranes are normal and external auditory canals are        

      clear.  Oropharynx with no redness, swelling, or masses, exudates, or evidence of           

      obstruction, uvula midline.  Mucous membranes moist. Chest/axilla:  Normal chest wall       

      appearance and motion.  Nontender with no deformity.  No lesions are appreciated.           

      Respiratory:  Lungs have equal breath sounds bilaterally, clear to auscultation and         

      percussion.  No rales, rhonchi or wheezes noted.  No increased work of breathing, no        

      retractions or nasal flaring. Abdomen/GI:  Soft, non-tender, with normal bowel sounds.      

      No distension or tympany.  No guarding or rebound.  No evidence of tenderness               

      throughout. MS/ Extremity:  Pulses equal, no cyanosis.  Neurovascular intact.  Full,        

      normal range of motion. Neuro:  Awake and alert, GCS 15, oriented to person, place,         

      time, and situation.  Cranial nerves II-XII grossly intact.  Motor strength 5/5 in all      

      extremities.  Sensory grossly intact.  Cerebellar exam normal.  Normal gait. Psych:         

      Awake, alert, with orientation to person, place and time.  Behavior, mood, and affect       

      are within normal limits.                                                                   

14:43 Constitutional: The patient appears diaphoretic, in obvious distress.                       

14:43 Cardiovascular: tachycardic, regular rhythm.                                                

14:43 Skin: Pale, diaphoretic.                                                                    

14:43 ECG was reviewed by the Attending Physician.                                            rt  

                                                                                                  

Vital Signs:                                                                                      

13:57  / 104; Pulse 132; Resp 40; Pulse Ox 100% ;                                       ph  

14:23  / 105; Pulse 127; Resp 40; Pulse Ox 99% on Non-rebreather mask;                  ll1 

14:38 Weight 129.27 kg;                                                                       ll1 

14:51 Temp 97.0(TE);                                                                          ll1 

14:53  / 117; Pulse 128; Resp 25; Pulse Ox 97% ;                                        ll1 

14:55 Pulse 131; Pulse Ox 96% on BVM;                                                         ll1 

14:56 Pulse 122; Resp 19; Pulse Ox 95% on ETT ambu;                                           ll1 

15:02 BP 97 / 65; Pulse 136; Resp 16; Pulse Ox 94% on ETT ambu;                               ll1 

15:10  / 80; Pulse 105; Resp 16; Pulse Ox 88% ;                                         ll1 

15:18  / 80; Pulse 103; Resp 14; Pulse Ox 88% on 100% FiO2 ETT vent;                    ph  

15:36 Pulse 99; Resp 14; Pulse Ox 86% ;                                                       ll1 

15:50 BP 89 / 67; Pulse 102; Resp 16; Pulse Ox 89% on ETT vent;                               ll1 

15:55  / 91; Pulse 103; Resp 16; Pulse Ox 89% on ETT vent;                              ll1 

16:20  / 86; Pulse 97; Resp 20; Temp 96.5(TE); Pulse Ox 92% on ETT vent;                ll1 

17:17  / 81; Pulse 99; Resp 20; Temp 97.0(TE); Pulse Ox 93% ;                           ll1 

                                                                                                  

Procedures:                                                                                       

15:06 Cardioversion: (synchronized) using defib paddles, for treatment of V tach, with 200    rt  

      joules X 1. Post procedure rhythm is unchanged, the patient tolerated the procedure         

      well. Intubation: Ventilated with 100% NRB prior to procedure. O2 saturation prior to       

      procedure was 100 %. Intubated orally using glidescope with 8.0 mm ETT. was successful      

      on first attempt. Ventilated with ventilator. Tube secured with ETT link Placement        

      verified by CXR, CO2 detector with (+) color change, auscultating bilateral breath          

      sounds, Patient tolerated well.                                                             

                                                                                                  

MDM:                                                                                              

13:59 Patient medically screened.                                                             rt  

16:06 Differential diagnosis: Pneumothorax pulmonary edema, Pulmonary Embolism vtach, svt,    rt  

      afib. Data reviewed: vital signs, nurses notes, lab test result(s), EKG, radiologic         

      studies. ED course: Presents to the ED with dyspnea. He was found of a wide-complex         

      tachycardia. Patient received multiple shocks, doses of antiarrhythmics. I discussed        

      the case with Dr. Bustillo commended lidocaine, then subsequently metoprolol. Patient's       

      heart rate did improve with metoprolol, continues to be wide-complex, with more             

      ischemic appearing changes. Cardiology recommendations, will transfer for                   

      electrophysiology. I discussed the case with Dr. Espinoza at [facility]'s, discussed       

      giving lytics, states that more likely is dysrhythmic and without chest pain my does        

      not require lytics. Patient with pulmonary edema, hypoxia. Will give dose of Lasix.         

      Patient is critically ill, requires transport by helicopter..                               

                                                                                                  

                                                                                             

13:55 Order name: Basic Metabolic Panel; Complete Time: 14:53                                 ph  

                                                                                             

13:55 Order name: CBC with Diff; Complete Time: 14:33                                         ph  

                                                                                             

13:55 Order name: Troponin HS; Complete Time: 14:53                                           ph  

                                                                                             

14:00 Order name: Magnesium; Complete Time: 15:04                                             rt  

                                                                                             

14:00 Order name: BNP; Complete Time: 15:04                                                   rt  

                                                                                             

14:17 Order name: SARS RAPID; Complete Time: 14:52                                            ll1 

                                                                                             

13:55 Order name: XRAY Chest (1 view); Complete Time: 15:51                                   ph  

                                                                                             

15:10 Order name: Chest Single View XRAY; Complete Time: 15:51                                rt  

                                                                                             

15:10 Order name: ABG; Complete Time: 17:10                                                   rt  

                                                                                             

13:55 Order name: EKG; Complete Time: 13:55                                                   ph  

                                                                                             

13:55 Order name: Cardiac monitoring; Complete Time: 13:55                                    ph  

                                                                                             

13:55 Order name: EKG - Nurse/Tech; Complete Time: 13:55                                      ph  

                                                                                             

13:55 Order name: IV Saline Lock; Complete Time: 13:55                                        ph  

                                                                                             

13:55 Order name: Labs collected and sent; Complete Time: 13:55                               ph  

                                                                                             

13:55 Order name: O2 Per Protocol; Complete Time: 13:55                                       ph  

                                                                                             

13:55 Order name: O2 Sat Monitoring; Complete Time: 13:55                                     ph  

                                                                                             

15:36 Order name: EKG; Complete Time: 15:37                                                   rt  

                                                                                             

15:36 Order name: EKG - Nurse/Tech; Complete Time: 15:59                                      rt  

                                                                                                  

EC:43 Rate is 133 beats/min. QRS interval is prolonged at 146 msec. QT interval is prolonged  rt  

      at 642 msec. No Q waves. Interpreted by me.                                                 

14:47 Rate is 133 beats/min. QRS interval is prolonged at 146 msec. QT interval is prolonged  rt  

      at 642 msec. Interpreted by me.                                                             

15:47 Rate is 113 beats/min. Rhythm is regular, V tach with No ectopy. QRS interval is        rt  

      prolonged at 162 msec. QT interval is prolonged at 666 msec.                                

                                                                                                  

Administered Medications:                                                                         

14:00 Drug: Lidocaine 100 mg Route: IVP; Site: left antecubital;                              rt  

15:14 Follow up: Response: No adverse reaction                                                ll1 

14:53 Drug: Etomidate 15 mg Route: IVP; Site: left antecubital;                               ll1 

15:14 Follow up: Response: No adverse reaction                                                1 

14:53 Drug: Magnesium Sulfate 2 grams Route: IVPB; Infused Over: 2 mins; Site: left           ll1 

      antecubital;                                                                                

17:18 Follow up: Response: No adverse reaction; IV Status: Completed infusion; IV Intake:     ll1 

      100ml                                                                                       

14:53 Drug: Calcium Gluconate 1 grams Route: IVPB; Infused Over: 60 mins; Site: left          ll1 

      antecubital;                                                                                

15:23 Follow up: Response: No adverse reaction; IV Status: Completed infusion; IV Intake:     ll1 

      100ml                                                                                       

14:54 Drug: Rocuronium 130 mg Route: IVP; Site: left antecubital;                             ll1 

15:14 Follow up: Response: No adverse reaction                                                ll1 

15:00 Drug: Propofol 5 mcg/kg/min Route: IV; Rate: calculated rate; Site: left forearm;       ll1 

17:19 Follow up: Response: No adverse reaction; IV Status: Infusion continued upon transfer;  [facility] 

      IV Intake: 50ml                                                                             

15:00 Drug: Metoprolol 5 mg Route: IVP; Site: left antecubital;                               ll1 

15:02 Drug: Metoprolol 5 mg Route: IVP; Site: left antecubital;                               ll1 

15:59 Drug: Metoprolol 5 mg Route: IVP; Site: left antecubital;                               ll1 

17:18 Follow up: Response: No adverse reaction                                                1 

16:17 Drug: Lasix (furosemide) 40 mg Route: IVP; Site: left antecubital;                      ll1 

17:18 Follow up: Response: No adverse reaction                                                [facility] 

                                                                                                  

                                                                                                  

Disposition:                                                                                      

16:10 Critical Care:.                                                                         rt  

                                                                                                  

Disposition Summary:                                                                              

23 16:05                                                                                    

Transfer Ordered                                                                                  

      Transfer Location: [facility]                                rt  

      Reason: Higher level of care                                                            rt  

      Condition: Critical                                                                     rt  

      Problem: new                                                                            rt  

      Symptoms: have improved                                                                 rt  

      Accepting Physician: Dr. Espinoza(23 17:19)                                           ll1 

      Diagnosis                                                                                   

        - Ventricular tachycardia                                                             rt  

        - Acute respiratory failure                                                           rt  

        - Hypomagnesemia                                                                      rt  

      Forms:                                                                                      

        - Medication Reconciliation Form                                                      rt  

        - SBAR form                                                                           rt  

Critical care time excluding procedures:                                                          

16:10 Critical care time: Bedside Care: 60 minutes, Consultation: 15 minutes. Total time: 75  rt  

      minutes                                                                                     

                                                                                                  

Signatures:                                                                                       

Dispatcher [employer]                           EDChristie Rich RN                      RN                                                      

Asuncion Oliveira RN                       RN   ll1                                                  

Adiel Marvin MD MD   rt                                                   

                                                                                                  

Corrections: (The following items were deleted from the chart)                                    

17:19 16:05 Dr. Espinoza rt                                                                       ll1 

                                                                                                  

************************************************************************************************** Private car

## 2023-01-03 NOTE — XMS REPORT
Continuity of Care Document

                           Created on:January 3, 2023



Patient:ANGELES ROA

Sex:Male

:1960

External Reference #:[id number]





Demographics







                          Address                   [address] [place] [address]

 

                          Home Phone                [phone]

 

                          Mobile Phone              [phone] )

 

                          Email Address             [email or web address]

 

                          Preferred Language        English

 

                          Marital Status            Unknown

 

                          [employer] Affiliation     Unknown

 

                          Race                      Unknown

 

                          Additional Race(s)        Unavailable



                                                    Unavailable

 

                          Ethnic Group              Unknown









Author







                          Organization              [facility]

t

 

                          Address                   [address] [place] [id number]

 

                          Phone                     [phone]









Care Team Providers







                    Name                Role                Phone

 

                    Unavailable         Unavailable         Unavailable









Problems

This patient has no known problems.



Allergies, Adverse Reactions, Alerts

This patient has no known allergies or adverse reactions.



Medications

This patient has no known medications.



Procedures

This patient has no known procedures.



Encounters







        Start   End     Encounter Admission Attending Care    Care    Encounter 

Source



        Date/Time Date/Time Type    Type    Clinicians Facility Department ID   

   

 

        2022 Outpatient                 [facility]     [facility]      [facility]



        13:12:46 13:12:46                                            [facility]

 

        2022 Outpatient                 SFA     [facility]      [facility]



        13:09:55 13:09:55                                            [facility]

 

        2022 Outpatient                 SFA     SFA      [facility]



        15:37:46 15:37:46                                            [facility]

 

        2022 Outpatient                 SFA     SFA      [facility]



        15:22:21 15:22:21                                            [facility]

 

        2022 Outpatient                 SFA     SFA      [facility]



        16:52:30 16:52:30                                            [facility]

 

        2022 Outpatient                 SFA     SFA      [facility]



        13:45:08 13:45:08                                            [facility]

 

        2022 Outpatient                 SFA     S[facility]      [facility]



        15:40:48 15:40:48                                            [facility]

 

        2022 Outpatient                 SFA     SFA     [id number] [facility]



        16:29:57 16:29:57                                            



                                                                        [place]

 

        2022 Outpatient                 [facility]      [facility]



        15:34:47 15:34:47                                            F



                                                                        [place]

 

        2022-10-25 2022-10-25 Outpatient                 [facility]      [facility]



        15:38:02 15:38:02                                            F



                                                                        [place]

 

        2022-10-15 2022-10-15 Outpatient                 [facility]      [facility]



        08:56:28 08:56:28                                            F



                                                                        [place]







Results







           Test Description Test Time  Test Comments Results    Result Comments 

Source









                    TESTOSTERONE        2022-10-17 04:07:08 









                      Test Item  Value      Reference Range Interpretation Comme

nts









             TESTOSTERONE (test code = 2830) 196 NG/DL    300-720      L        

     UNLESS OTHERWISE INDICATED,

ALL



                                                                 TESTING PERFORM

ED ATCLINICAL



                                                                 PATHOLOGY [facility], [facility] [address]



                                                                 [place] [address]



                                                                 LABORATORY DIRE

CTOR: LENARD JUAREZ M.D.

 CLIA NUMBER



                                                                 [id number] CAP 

ACCREDITATION NO.



                                                                 [id number]



FSH + LH PROFILE2022-10-17 04:06:22





             Test Item    Value        Reference Range Interpretation Comments

 

             FOLLICLE STIM HORMONE (test code = 6.1 IU/L     1.5-12.4           

       



             2700)                                               

 

             LUTEINIZING HORMONE (test code = 6.0 IU/L     1.2-8.6              

     



             2776)                                               



[id number] 02:33:25





             Test Item    Value        Reference Range Interpretation Comments

 

             TESTOSTERONE (test 195 NG/DL    300-720      L             UNLESS O

THERWISE



             code = 2830)                                        INDICATED, ALL 

TESTING



                                                                 PERFORMED [facility]



                                                                 PATHOLOGY



                                                                 [facility], [facility].



                                                                 [address] [place] [address] [facility]



                                                                 DIRECTOR: LENARD JUAREZ M.D.

 CLIA



                                                                 NUMBER [id number]

03 CAP



                                                                 ACCREDITATION N

O.



                                                                 [id number]



HEMOGLOBIN [id number] 03:14:07





             Test Item    Value        Reference Range Interpretation Comments

 

             HEMOGLOBIN A1c (test code = 62313) 5.6 %        4.2-5.6            

       



HIV 1/2 4TH GEN, RFLX [id number] 03:12:57





             Test Item    Value        Reference Range Interpretation Comments

 

             HIV 1/2 4TH GEN, RFLX CONF (test NON-REACTIVE NON-REACTIVE         

     



             code = 3514)                                        



LIPID [id number] 02:36:06





             Test Item    Value        Reference Range Interpretation Comments

 

             CHOLESTEROL (test 160 MG/DL    <200                      



             code = 2210)                                        

 

             TRIGLYCERIDES (test 257 MG/DL    <150         H            



             code = 2232)                                        

 

             HDL CHOLESTEROL (test 24 MG/DL     >39          L            



             code = 2220)                                        

 

             CALC LDL CHOL (test 99 MG/DL     <100                       NOTE: C

ALCULATED LDL



             code = 2237)                                        IS BASED ON



                                                                 PRIMITIVO-DORADO 

METHOD



                                                                 WHICHINCLUDES



                                                                 ADJUSTABLE



                                                                 TRIGLYCERIDE:VL

DL



                                                                 CHOLESTEROL RAT

IO.THIS



                                                                 FACTOR VARIES B

Y



                                                                 MEASURED TRIGLY

CERIDE



                                                                 AND NON-HDLCHOL

ESTEROL



                                                                 CONCENTRATIONS 

WITH



                                                                 INCREASED CALCU

LATED



                                                                 LDL SEENIN HIGH

ER



                                                                 TRIGLYCERIDE OR

 LOWER



                                                                 NON-HDL SPECIME

NS. FOR



                                                                 MOREINFORMATION

, SEE



                                                                 CLIENT ANNOUNCE

MENT AT



                                                                 [email or web address]



                                                                 [email or web address]

 

             RISK RATIO LDL/HDL 4.13 RATIO   <3.55        H            



             (test code = 2238)                                        



COMPREHENSIVE METABOLIC [id number] 02:36:06





             Test Item    Value        Reference Range Interpretation Comments

 

             GLUCOSE (test code = 157 MG/DL    70-99        H            



             )                                               

 

             BUN (test code = 17 MG/DL     8-23                      



             )                                               

 

             CREATININE (test 1.02 MG/DL   0.80-1.40                 



             code = 2214)                                        

 

             eGFR ( CKD-EPI) 83 ML/MIN/1.73 >60                       



             (test code = 76890)                                        

 

             CALC BUN/CREAT (test 17 RATIO     6-28                      



             code = 2235)                                        

 

             SODIUM (test code = 142 MEQ/L    133-146                   



             )                                               

 

             POTASSIUM (test code 4.1 MEQ/L    3.5-5.4                   



             = )                                             

 

             CHLORIDE (test code 103 MEQ/L                        



             = )                                             

 

             CARBON DIOXIDE (test 24 MEQ/L     19-31                     



             code = 2206)                                        

 

             CALCIUM (test code = 8.9 MG/DL    8.5-10.5                  



             )                                               

 

             PROTEIN, TOTAL (test 7.0 G/DL     6.1-8.3                   



             code = 2229)                                        

 

             ALBUMIN (test code = 4.3 G/DL     3.5-5.2                   



             )                                               

 

             CALC GLOBULIN (test 2.7 G/DL     1.9-3.7                   



             code = 2240)                                        

 

             CALC A/G RATIO (test 1.6 RATIO    1.0-2.6                   



             code = 2234)                                        

 

             BILIRUBIN, TOTAL 0.7 MG/DL    See_Comment                [Automated

 message]



             (test code = 2207)                                        The syste

[employer] which



                                                                 generated this



                                                                 result transmit

kirsty



                                                                 reference range

:



                                                                 <=1.2. The refe

rence



                                                                 range was not u

sed



                                                                 to interpret th

is



                                                                 result as



                                                                 normal/abnormal

.

 

             ALKALINE PHOSPHATASE 71 U/L                           



             (test code = 2204)                                        

 

             AST (test code = 23 U/L       2218)                                               

 

             ALT (test code = 20 U/L       50                      



             )                                               



PSA, [id number] 00:46:58





             Test Item    Value        Reference Range Interpretation Comments

 

             PSA, TOTAL   1.30 NG/ML   See_Comment                NOTE: Methodol

ogy is Roche



             (test code =                                        [employer]

emiluminescence



             2606)                                               Immunoassay tra

ceable to WHO



                                                                 reference stand

viry 96/760.



                                                                 [Automated mess

age] The system



                                                                 which generated

 this result



                                                                 transmitted ref

erence range:



                                                                 <=4.00. The ref

erence range was



                                                                 not used to int

erpret this



                                                                 result as beto

l/abnormal.



TSH, THIRD [id number] 00:46:58





             Test Item    Value        Reference Range Interpretation Comments

 

             TSH, THIRD   3.510 UIU/ML 0.400-4.100                UNLESS OTHERWI

SE



             GENERATION (test                                        INDICATED, 

ALL TESTING



             code = 2821)                                        PERFORMED [facility]



                                                                 PATHOLOGY



                                                                 LABORATORIES, [facility].



                                                                 [address] [place] [address] [id number] [place] [address]



                                                                 DIRECTOR: LENARD JUAREZ M.D.

 CLIA



                                                                 NUMBER [id number]

03 CAP



                                                                 ACCREDITATION N

O.



                                                                 [id number]



[id number] 00:44:00





             Test Item    Value        Reference Range Interpretation Comments

 

             TESTOSTERONE (test code = 2830) 127 NG/DL    300-720      L        

    



CBC W/AUTO DIFF WITH [id number] 02:44:51





             Test Item    Value        Reference Range Interpretation Comments

 

             WBC (test code = 7.3 K/UL     3.5-11.0                  



             1001)                                               

 

             RBC (test code = 5.09 M/UL    4.50-6.10                 



             1002)                                               

 

             HEMOGLOBIN (test code 16.4 G/DL    13.5-17.0                 



             = 1003)                                             

 

             HEMATOCRIT (test code 48.9 %       40.0-51.0                 



             = 1004)                                             

 

             MCV (test code = 96.1 fL      80.0-99.0                 



             1005)                                               

 

             MCH (test code = 32.2 PG      25.0-33.0                 



             1006)                                               

 

             MCHC (test code = 33.5 G/DL    31.0-36.0                 



             1007)                                               

 

             RDW (test code = 14.0 %       11.5-15.0                 



             1038)                                               

 

             NEUTROPHILS (test 74.3 %                                 



             code = 1008)                                        

 

             LYMPHOCYTES (test 17.1 %                                 



             code = 1010)                                        

 

             MONOCYTES (test code 6.3 %                                  



             = 1011)                                             

 

             EOSINOPHILS (test 1.5 %                                  



             code = 1012)                                        

 

             BASOPHILS (test code 0.4 %                                  



             = 1013)                                             

 

             IMMATURE GRANULOCYTES 0.4 %                                  



             (test code = 1036)                                        

 

             NUCLEATED RBCS (test 0.0 /100 WBC'S See_Comment                [Aut

omated



             code = 1065)                                        message] The sy

stem



                                                                 which generated



                                                                 this result



                                                                 transmitted



                                                                 reference range

:



                                                                 0.0. The refere

nce



                                                                 range was not u

sed



                                                                 to interpret th

is



                                                                 result as



                                                                 normal/abnormal

.

 

             PLATELET COUNT (test 146 K/UL     130-400                   



             code = 1015)                                        

 

             ABSOLUTE NEUTROPHILS 5.40 K/UL    1.50-7.50                 



             (test code = 1066)                                        

 

             ABSOLUTE LYMPHOCYTES 1.24 K/UL    1.00-4.00                 



             (test code = 1067)                                        

 

             ABSOLUTE MONOCYTES 0.46 K/UL    0.20-1.00                 



             (test code = 1068)                                        

 

             ABSOLUTE EOSINOPHILS 0.11 K/UL    0.00-0.50                 



             (test code = 1040)                                        

 

             ABSOLUTE BASOPHILS 0.03 K/UL    0.00-0.20                 



             (test code = 1069)                                        

 

             ABS IMMATURE 0.03 K/UL    0.00-0.10                 



             GRANULOCYTES (test                                        



             code = 1020)                                        

 

             ABS NUCLEATED RBCS 0.00 K/UL    0.00-0.11                 



             (test code = 87323)

## 2023-01-03 NOTE — RAD REPORT
EXAM DESCRIPTION:  RAD - Chest Single View - 1/3/2023 3:42 pm

 

CLINICAL HISTORY:  intubation

 

COMPARISON:  Chest Single View dated 1/3/2023; Chest Single View dated 3/4/2022; Chest Single View da
kirsty 12/6/2021; Chest Single View dated 8/10/2021

 

FINDINGS:  Radiograph of the lower chest upper abdomen demonstrates placement of the enteric tube ove
rlying the stomach. Defibrillator pad .

 

IMPRESSION:  Enteric tube overlies the stomach.

## 2023-01-04 NOTE — EKG
Test Date:    2023-01-03               Test Time:    15:42:39

Technician:   VALENTIN                                    

                                                     

MEASUREMENT RESULTS:                                       

Intervals:                                           

Rate:         113                                    

SD:                                                  

QRSD:         162                                    

QT:           486                                    

QTc:          666                                    

Axis:                                                

P:                                                   

SD:                                                  

QRS:          -41                                    

T:            105                                    

                                                     

INTERPRETIVE STATEMENTS:                                       

                                                     

*** Suspect unspecified pacemaker failure

Wide QRS rhythm with occasional premature ventricular complexes

Left axis deviation

Left bundle branch block

Abnormal ECG

Compared to ECG 01/03/2023 14:19:19

Uncertain supraventricular rhythm now present

Ventricular premature complex(es) now present

Left-axis deviation now present

Wide-QRS tachycardia no longer present



Electronically Signed On 01-04-23 16:03:34 CST by Kuldip Bustillo

## 2023-01-04 NOTE — EKG
Test Date:    2023-01-03               Test Time:    14:19:19

Technician:   JOHN                                     

                                                     

MEASUREMENT RESULTS:                                       

Intervals:                                           

Rate:         125                                    

NJ:                                                  

QRSD:         126                                    

QT:           436                                    

QTc:          629                                    

Axis:                                                

P:                                                   

NJ:                                                  

QRS:          -24                                    

T:            116                                    

                                                     

INTERPRETIVE STATEMENTS:                                       

                                                     

Wide QRS tachycardia

Left bundle branch block

Abnormal ECG

Compared to ECG 01/03/2023 13:48:45

Left-axis deviation no longer present

Wide-QRS tachycardia still present



Electronically Signed On 01-04-23 16:03:54 CST by Kuldip Bustillo